# Patient Record
Sex: FEMALE | Race: WHITE | NOT HISPANIC OR LATINO | Employment: FULL TIME | URBAN - NONMETROPOLITAN AREA
[De-identification: names, ages, dates, MRNs, and addresses within clinical notes are randomized per-mention and may not be internally consistent; named-entity substitution may affect disease eponyms.]

---

## 2020-01-13 ENCOUNTER — HOSPITAL ENCOUNTER (OUTPATIENT)
Dept: LAB | Facility: MEDICAL CENTER | Age: 30
End: 2020-01-13
Attending: PSYCHIATRY & NEUROLOGY
Payer: MEDICAID

## 2020-01-13 LAB — ERYTHROCYTE [SEDIMENTATION RATE] IN BLOOD BY WESTERGREN METHOD: 6 MM/HOUR (ref 0–20)

## 2020-01-13 PROCEDURE — 86140 C-REACTIVE PROTEIN: CPT

## 2020-01-13 PROCEDURE — 82607 VITAMIN B-12: CPT

## 2020-01-13 PROCEDURE — 86038 ANTINUCLEAR ANTIBODIES: CPT

## 2020-01-13 PROCEDURE — 86376 MICROSOMAL ANTIBODY EACH: CPT

## 2020-01-13 PROCEDURE — 85652 RBC SED RATE AUTOMATED: CPT

## 2020-01-13 PROCEDURE — 86800 THYROGLOBULIN ANTIBODY: CPT

## 2020-01-13 PROCEDURE — 36415 COLL VENOUS BLD VENIPUNCTURE: CPT

## 2020-01-14 LAB
CRP SERPL HS-MCNC: 0.47 MG/DL (ref 0–0.75)
THYROPEROXIDASE AB SERPL-ACNC: 127.4 IU/ML (ref 0–9)
VIT B12 SERPL-MCNC: 272 PG/ML (ref 211–911)

## 2020-01-15 LAB
NUCLEAR IGG SER QL IA: NORMAL
THYROGLOB AB SERPL-ACNC: 1.6 IU/ML (ref 0–4)

## 2020-01-24 ENCOUNTER — HOSPITAL ENCOUNTER (OUTPATIENT)
Dept: LAB | Facility: MEDICAL CENTER | Age: 30
End: 2020-01-24
Attending: PSYCHIATRY & NEUROLOGY
Payer: MEDICAID

## 2020-01-24 LAB
APTT PPP: 29.4 SEC (ref 24.7–36)
BASOPHILS # BLD AUTO: 0.2 % (ref 0–1.8)
BASOPHILS # BLD: 0.02 K/UL (ref 0–0.12)
EOSINOPHIL # BLD AUTO: 0 K/UL (ref 0–0.51)
EOSINOPHIL NFR BLD: 0 % (ref 0–6.9)
ERYTHROCYTE [DISTWIDTH] IN BLOOD BY AUTOMATED COUNT: 45.2 FL (ref 35.9–50)
HCT VFR BLD AUTO: 41.9 % (ref 37–47)
HGB BLD-MCNC: 13.1 G/DL (ref 12–16)
IMM GRANULOCYTES # BLD AUTO: 0.02 K/UL (ref 0–0.11)
IMM GRANULOCYTES NFR BLD AUTO: 0.2 % (ref 0–0.9)
INR PPP: 0.85 (ref 0.87–1.13)
LYMPHOCYTES # BLD AUTO: 2.3 K/UL (ref 1–4.8)
LYMPHOCYTES NFR BLD: 26.4 % (ref 22–41)
MCH RBC QN AUTO: 26.3 PG (ref 27–33)
MCHC RBC AUTO-ENTMCNC: 31.3 G/DL (ref 33.6–35)
MCV RBC AUTO: 84.1 FL (ref 81.4–97.8)
MONOCYTES # BLD AUTO: 0.44 K/UL (ref 0–0.85)
MONOCYTES NFR BLD AUTO: 5.1 % (ref 0–13.4)
NEUTROPHILS # BLD AUTO: 5.92 K/UL (ref 2–7.15)
NEUTROPHILS NFR BLD: 68.1 % (ref 44–72)
NRBC # BLD AUTO: 0 K/UL
NRBC BLD-RTO: 0 /100 WBC
PLATELET # BLD AUTO: 261 K/UL (ref 164–446)
PMV BLD AUTO: 12.6 FL (ref 9–12.9)
PROTHROMBIN TIME: 11.8 SEC (ref 12–14.6)
RBC # BLD AUTO: 4.98 M/UL (ref 4.2–5.4)
WBC # BLD AUTO: 8.7 K/UL (ref 4.8–10.8)

## 2020-01-24 PROCEDURE — 85610 PROTHROMBIN TIME: CPT

## 2020-01-24 PROCEDURE — 36415 COLL VENOUS BLD VENIPUNCTURE: CPT

## 2020-01-24 PROCEDURE — 85730 THROMBOPLASTIN TIME PARTIAL: CPT

## 2020-01-24 PROCEDURE — 85025 COMPLETE CBC W/AUTO DIFF WBC: CPT

## 2020-02-11 ENCOUNTER — HOSPITAL ENCOUNTER (OUTPATIENT)
Dept: RADIOLOGY | Facility: MEDICAL CENTER | Age: 30
End: 2020-02-11
Attending: PSYCHIATRY & NEUROLOGY
Payer: MEDICAID

## 2020-02-11 ENCOUNTER — HOSPITAL ENCOUNTER (OUTPATIENT)
Dept: LAB | Facility: MEDICAL CENTER | Age: 30
End: 2020-02-11
Attending: PSYCHIATRY & NEUROLOGY
Payer: MEDICAID

## 2020-02-11 DIAGNOSIS — G93.2 BENIGN INTRACRANIAL HYPERTENSION: ICD-10-CM

## 2020-02-11 DIAGNOSIS — G43.009 MIGRAINE WITHOUT AURA AND WITHOUT STATUS MIGRAINOSUS, NOT INTRACTABLE: ICD-10-CM

## 2020-02-11 LAB
CLARITY CSF: CLEAR
COLOR CSF: COLORLESS
COLOR SPUN CSF: COLORLESS
CSF COMMENTS 1658: NORMAL
GLUCOSE CSF-MCNC: 52 MG/DL (ref 40–80)
LYMPHOCYTES NFR CSF: 87 %
MONONUC CELLS NFR CSF: 13 %
PROT CSF-MCNC: 28 MG/DL (ref 15–45)
RBC # CSF: <1 CELLS/UL
SPECIMEN VOL CSF: 8.8 ML
TUBE # CSF: 3
TUBE # CSF: 4
WBC # CSF: 1 CELLS/UL (ref 0–10)

## 2020-02-11 PROCEDURE — 83916 OLIGOCLONAL BANDS: CPT

## 2020-02-11 PROCEDURE — 89051 BODY FLUID CELL COUNT: CPT

## 2020-02-11 PROCEDURE — 62270 DX LMBR SPI PNXR: CPT

## 2020-02-11 PROCEDURE — 306637 DX-LUMBAR PUNCTURE FOR DIAGNOSIS

## 2020-02-11 PROCEDURE — 82945 GLUCOSE OTHER FLUID: CPT

## 2020-02-11 PROCEDURE — 84157 ASSAY OF PROTEIN OTHER: CPT

## 2020-02-11 PROCEDURE — 62328 DX LMBR SPI PNXR W/FLUOR/CT: CPT

## 2020-02-11 PROCEDURE — 83873 ASSAY OF CSF PROTEIN: CPT

## 2020-02-11 PROCEDURE — 70551 MRI BRAIN STEM W/O DYE: CPT

## 2020-02-13 LAB — MBP CSF-MCNC: 3.31 NG/ML (ref 0–5.5)

## 2020-02-14 LAB
OLIGOCLONAL BANDS CSF ELPH-IMP: NORMAL
OLIGOCLONAL BANDS CSF IEF: 0 BANDS (ref 0–1)
OLIGOCLONAL BANDS.IT SER+CSF QL: NEGATIVE

## 2021-02-08 ENCOUNTER — HOSPITAL ENCOUNTER (OUTPATIENT)
Dept: LAB | Facility: MEDICAL CENTER | Age: 31
End: 2021-02-08
Attending: NURSE PRACTITIONER
Payer: MEDICAID

## 2021-02-08 LAB
APTT PPP: 31.2 SEC (ref 24.7–36)
BASOPHILS # BLD AUTO: 0.1 % (ref 0–1.8)
BASOPHILS # BLD: 0.01 K/UL (ref 0–0.12)
EOSINOPHIL # BLD AUTO: 0.01 K/UL (ref 0–0.51)
EOSINOPHIL NFR BLD: 0.1 % (ref 0–6.9)
ERYTHROCYTE [DISTWIDTH] IN BLOOD BY AUTOMATED COUNT: 46.2 FL (ref 35.9–50)
HCT VFR BLD AUTO: 42.2 % (ref 37–47)
HGB BLD-MCNC: 12.9 G/DL (ref 12–16)
IMM GRANULOCYTES # BLD AUTO: 0.02 K/UL (ref 0–0.11)
IMM GRANULOCYTES NFR BLD AUTO: 0.2 % (ref 0–0.9)
INR PPP: 0.96 (ref 0.87–1.13)
LYMPHOCYTES # BLD AUTO: 1.99 K/UL (ref 1–4.8)
LYMPHOCYTES NFR BLD: 22.2 % (ref 22–41)
MCH RBC QN AUTO: 26.1 PG (ref 27–33)
MCHC RBC AUTO-ENTMCNC: 30.6 G/DL (ref 33.6–35)
MCV RBC AUTO: 85.4 FL (ref 81.4–97.8)
MONOCYTES # BLD AUTO: 0.58 K/UL (ref 0–0.85)
MONOCYTES NFR BLD AUTO: 6.5 % (ref 0–13.4)
NEUTROPHILS # BLD AUTO: 6.36 K/UL (ref 2–7.15)
NEUTROPHILS NFR BLD: 70.9 % (ref 44–72)
NRBC # BLD AUTO: 0 K/UL
NRBC BLD-RTO: 0 /100 WBC
PLATELET # BLD AUTO: 217 K/UL (ref 164–446)
PMV BLD AUTO: 13.1 FL (ref 9–12.9)
PROTHROMBIN TIME: 13.1 SEC (ref 12–14.6)
RBC # BLD AUTO: 4.94 M/UL (ref 4.2–5.4)
WBC # BLD AUTO: 9 K/UL (ref 4.8–10.8)

## 2021-02-08 PROCEDURE — 36415 COLL VENOUS BLD VENIPUNCTURE: CPT

## 2021-02-08 PROCEDURE — 85610 PROTHROMBIN TIME: CPT

## 2021-02-08 PROCEDURE — 85730 THROMBOPLASTIN TIME PARTIAL: CPT

## 2021-02-08 PROCEDURE — 85025 COMPLETE CBC W/AUTO DIFF WBC: CPT

## 2021-02-11 ENCOUNTER — HOSPITAL ENCOUNTER (OUTPATIENT)
Dept: RADIOLOGY | Facility: MEDICAL CENTER | Age: 31
End: 2021-02-11
Attending: NURSE PRACTITIONER
Payer: MEDICAID

## 2021-02-11 DIAGNOSIS — G93.2 BENIGN INTRACRANIAL HYPERTENSION: ICD-10-CM

## 2021-02-11 DIAGNOSIS — E66.3 SEVERELY OVERWEIGHT: ICD-10-CM

## 2021-02-11 DIAGNOSIS — G43.009 MIGRAINE WITHOUT AURA AND WITHOUT STATUS MIGRAINOSUS, NOT INTRACTABLE: ICD-10-CM

## 2021-02-11 LAB — PROT CSF-MCNC: 26 MG/DL (ref 15–45)

## 2021-02-11 PROCEDURE — 306637 DX-LUMBAR PUNCTURE FOR DIAGNOSIS

## 2021-02-11 PROCEDURE — 62328 DX LMBR SPI PNXR W/FLUOR/CT: CPT

## 2021-02-11 PROCEDURE — 62270 DX LMBR SPI PNXR: CPT

## 2021-02-11 PROCEDURE — 84157 ASSAY OF PROTEIN OTHER: CPT

## 2021-02-19 ENCOUNTER — OFFICE VISIT (OUTPATIENT)
Dept: URGENT CARE | Facility: PHYSICIAN GROUP | Age: 31
End: 2021-02-19
Payer: MEDICAID

## 2021-02-19 VITALS
OXYGEN SATURATION: 97 % | DIASTOLIC BLOOD PRESSURE: 70 MMHG | HEIGHT: 65 IN | BODY MASS INDEX: 40.48 KG/M2 | RESPIRATION RATE: 16 BRPM | HEART RATE: 84 BPM | TEMPERATURE: 97.1 F | WEIGHT: 243 LBS | SYSTOLIC BLOOD PRESSURE: 126 MMHG

## 2021-02-19 DIAGNOSIS — L08.9 SKIN INFECTION: ICD-10-CM

## 2021-02-19 PROCEDURE — 99204 OFFICE O/P NEW MOD 45 MIN: CPT | Performed by: FAMILY MEDICINE

## 2021-02-19 RX ORDER — SUMATRIPTAN 100 MG/1
TABLET, FILM COATED ORAL
COMMUNITY
Start: 2020-12-17 | End: 2021-02-19

## 2021-02-19 RX ORDER — DOXYCYCLINE HYCLATE 100 MG
100 TABLET ORAL 2 TIMES DAILY
Qty: 14 TABLET | Refills: 0 | Status: SHIPPED | OUTPATIENT
Start: 2021-02-19 | End: 2021-02-26

## 2021-02-19 RX ORDER — SERTRALINE HYDROCHLORIDE 100 MG/1
100 TABLET, FILM COATED ORAL DAILY
COMMUNITY
Start: 2020-10-05

## 2021-02-19 RX ORDER — VERAPAMIL HYDROCHLORIDE 240 MG/1
CAPSULE, EXTENDED RELEASE ORAL
Status: ON HOLD | COMMUNITY
Start: 2020-10-05 | End: 2022-12-13

## 2021-02-19 RX ORDER — BUPROPION HYDROCHLORIDE 75 MG/1
TABLET ORAL
Status: ON HOLD | COMMUNITY
Start: 2020-12-28 | End: 2022-12-29

## 2021-02-19 RX ORDER — LEVOTHYROXINE SODIUM 112 UG/1
224-336 TABLET ORAL
COMMUNITY
Start: 2020-12-09

## 2021-02-19 RX ORDER — ACETAZOLAMIDE 250 MG/1
TABLET ORAL
Status: ON HOLD | COMMUNITY
Start: 2020-12-28 | End: 2022-12-02

## 2021-02-19 RX ORDER — SERTRALINE HYDROCHLORIDE 100 MG/1
TABLET, FILM COATED ORAL
COMMUNITY
Start: 2021-01-13 | End: 2021-02-19

## 2021-02-19 RX ORDER — VERAPAMIL HYDROCHLORIDE 240 MG/1
CAPSULE, EXTENDED RELEASE ORAL
COMMUNITY
Start: 2021-01-13 | End: 2021-02-19

## 2021-03-30 ENCOUNTER — OFFICE VISIT (OUTPATIENT)
Dept: URGENT CARE | Facility: PHYSICIAN GROUP | Age: 31
End: 2021-03-30
Payer: MEDICAID

## 2021-03-30 VITALS
RESPIRATION RATE: 16 BRPM | WEIGHT: 245 LBS | HEIGHT: 65 IN | SYSTOLIC BLOOD PRESSURE: 122 MMHG | TEMPERATURE: 97.6 F | BODY MASS INDEX: 40.82 KG/M2 | OXYGEN SATURATION: 96 % | DIASTOLIC BLOOD PRESSURE: 66 MMHG | HEART RATE: 101 BPM

## 2021-03-30 DIAGNOSIS — S05.01XA ABRASION OF RIGHT CORNEA, INITIAL ENCOUNTER: ICD-10-CM

## 2021-03-30 DIAGNOSIS — H10.31 ACUTE BACTERIAL CONJUNCTIVITIS OF RIGHT EYE: ICD-10-CM

## 2021-03-30 PROCEDURE — 99213 OFFICE O/P EST LOW 20 MIN: CPT | Performed by: FAMILY MEDICINE

## 2021-03-30 RX ORDER — ERYTHROMYCIN 5 MG/G
OINTMENT OPHTHALMIC
Qty: 3.5 G | Refills: 5 | Status: ON HOLD | OUTPATIENT
Start: 2021-03-30 | End: 2022-12-29

## 2021-03-30 NOTE — PROGRESS NOTES
Chief Complaint:    Chief Complaint   Patient presents with   • Eye Problem     corneal abrasion, thinks it is possibly infected now, eye redness and drainage        History of Present Illness:    She has long history of getting corneal abrasions. She has dry eyes and occasionally will inadvertently get something into her eye and she will rub it, causing an abrasion. This will happen approximately twice a year. The night of 3/28/21, she felt the same typical thing happened during the night while she was sleeping. She woke up feeling like she had another corneal abrasion. However, now her right eye has become red, more tender, has had crusting of eye upon awakening, and light sensitivity. She feels she now has infection which has happened 3 previous times. She has used E-mycin eye ointment in the past (rx'd by her Ophthalmologist) for similar problem with good results.      Past Medical History:    History reviewed. No pertinent past medical history.    Past Surgical History:    History reviewed. No pertinent surgical history.    Social History:    Social History     Socioeconomic History   • Marital status:      Spouse name: Not on file   • Number of children: Not on file   • Years of education: Not on file   • Highest education level: Not on file   Occupational History   • Not on file   Tobacco Use   • Smoking status: Never Smoker   • Smokeless tobacco: Never Used   Substance and Sexual Activity   • Alcohol use: Not Currently   • Drug use: Not Currently   • Sexual activity: Not on file   Other Topics Concern   • Not on file   Social History Narrative   • Not on file     Social Determinants of Health     Financial Resource Strain:    • Difficulty of Paying Living Expenses:    Food Insecurity:    • Worried About Running Out of Food in the Last Year:    • Ran Out of Food in the Last Year:    Transportation Needs:    • Lack of Transportation (Medical):    • Lack of Transportation (Non-Medical):    Physical  "Activity:    • Days of Exercise per Week:    • Minutes of Exercise per Session:    Stress:    • Feeling of Stress :    Social Connections:    • Frequency of Communication with Friends and Family:    • Frequency of Social Gatherings with Friends and Family:    • Attends Hinduism Services:    • Active Member of Clubs or Organizations:    • Attends Club or Organization Meetings:    • Marital Status:    Intimate Partner Violence:    • Fear of Current or Ex-Partner:    • Emotionally Abused:    • Physically Abused:    • Sexually Abused:      Family History:    History reviewed. No pertinent family history.    Medications:    Current Outpatient Medications on File Prior to Visit   Medication Sig Dispense Refill   • acetaZOLAMIDE (DIAMOX) 250 MG Tab      • sertraline (ZOLOFT) 100 MG Tab   See Instructions, TAKE TWO TABLETS BY MOUTH ONCE DAILY FOR 90 DAYS, # 180 tab, 1 Refill(s), Acute, Pharmacy: [facility], TAKE TWO TABLETS BY MOUTH ONCE DAILY FOR 90 DAYS, 165.1 cm, 07/01/20 14:43:00 PDT, Height/Length (cm) Non-Measured, 106...     • levothyroxine (SYNTHROID) 175 MCG Tab      • buPROPion (WELLBUTRIN) 75 MG Tab      • Verapamil HCl  MG CAPSULE SR 24 HR   See Instructions, TAKE ONE CAPSULE BY MOUTH TWO TIMES A DAY FOR 90 DAYS, # 180 cap, 1 Refill(s), Acute, Pharmacy: [facility], TAKE ONE CAPSULE BY MOUTH TWO TIMES A DAY FOR 90 DAYS, 165.1 cm, 07/01/20 14:43:00 PDT, Height/Length (cm) Non-Kamla...       No current facility-administered medications on file prior to visit.     Allergies:    Allergies   Allergen Reactions   • Prochlorperazine Hives     Other reaction(s): Insomnia, Rapid heart rate   • Lamotrigine Anaphylaxis   • Topiramate Hives and Rash   • Compazine Anxiety and Palpitations   • Azithromycin Itching       Vitals:    Vitals:    03/30/21 1611   BP: 122/66   Pulse: (!) 101   Resp: 16   Temp: 36.4 °C (97.6 °F)   SpO2: 96%   Weight: 111 kg (245 lb)   Height: 1.651 m (5' 5\")       Physical " Exam:    Constitutional: Vital signs reviewed. Appears well-developed and well-nourished. No acute distress.   Eyes: Right conjunctiva is moderately injected. Left sclera white, left conjunctiva clear. PERRLA. Right eye has mild-moderate photophobia.  ENT: External ears normal. Hearing normal.  Neck: Neck supple.   Pulmonary/Chest: Respirations non-labored.   Musculoskeletal: Normal gait. No muscular atrophy or weakness.  Neurological: Alert and oriented to person, place, and time. Muscle tone normal. Coordination normal.   Skin: No rashes or lesions. Warm, dry, normal turgor.  Psychiatric: Normal mood and affect. Behavior is normal. Judgment and thought content normal.       Assessment / Plan:    1. Acute bacterial conjunctivitis of right eye  - erythromycin 5 MG/GM Ointment; APPLY TO AFFECTED EYE EVERY 4 HOURS WHILE AWAKE UNTIL BETTER.  Dispense: 3.5 g; Refill: 5    2. Abrasion of right cornea, initial encounter  - erythromycin 5 MG/GM Ointment; APPLY TO AFFECTED EYE EVERY 4 HOURS WHILE AWAKE UNTIL BETTER.  Dispense: 3.5 g; Refill: 5      Discussed with her DDX, management options, and risks, benefits, and alternatives to treatment plan agreed upon.    Symptoms feel typical of previous similar problem, will treat with medication that has helped in the past.    Agreeable to medication prescribed.    Discussed expected course of duration, time for improvement, and to seek follow-up in Emergency Room, urgent care, or with PCP if getting worse at any time or not improving within expected time frame.

## 2021-08-09 ENCOUNTER — HOSPITAL ENCOUNTER (OUTPATIENT)
Dept: LAB | Facility: MEDICAL CENTER | Age: 31
End: 2021-08-09
Attending: PSYCHIATRY & NEUROLOGY
Payer: MEDICAID

## 2021-08-09 LAB
APTT PPP: 30 SEC (ref 24.7–36)
BASOPHILS # BLD AUTO: 0.1 % (ref 0–1.8)
BASOPHILS # BLD: 0.01 K/UL (ref 0–0.12)
EOSINOPHIL # BLD AUTO: 0 K/UL (ref 0–0.51)
EOSINOPHIL NFR BLD: 0 % (ref 0–6.9)
ERYTHROCYTE [DISTWIDTH] IN BLOOD BY AUTOMATED COUNT: 46.5 FL (ref 35.9–50)
HCT VFR BLD AUTO: 42.2 % (ref 37–47)
HGB BLD-MCNC: 13.1 G/DL (ref 12–16)
IMM GRANULOCYTES # BLD AUTO: 0.04 K/UL (ref 0–0.11)
IMM GRANULOCYTES NFR BLD AUTO: 0.4 % (ref 0–0.9)
INR PPP: 0.92 (ref 0.87–1.13)
LYMPHOCYTES # BLD AUTO: 2.12 K/UL (ref 1–4.8)
LYMPHOCYTES NFR BLD: 21.5 % (ref 22–41)
MCH RBC QN AUTO: 26.7 PG (ref 27–33)
MCHC RBC AUTO-ENTMCNC: 31 G/DL (ref 33.6–35)
MCV RBC AUTO: 85.9 FL (ref 81.4–97.8)
MONOCYTES # BLD AUTO: 0.5 K/UL (ref 0–0.85)
MONOCYTES NFR BLD AUTO: 5.1 % (ref 0–13.4)
NEUTROPHILS # BLD AUTO: 7.21 K/UL (ref 2–7.15)
NEUTROPHILS NFR BLD: 72.9 % (ref 44–72)
NRBC # BLD AUTO: 0 K/UL
NRBC BLD-RTO: 0 /100 WBC
PLATELET # BLD AUTO: 260 K/UL (ref 164–446)
PMV BLD AUTO: 12.7 FL (ref 9–12.9)
PROTHROMBIN TIME: 12.1 SEC (ref 12–14.6)
RBC # BLD AUTO: 4.91 M/UL (ref 4.2–5.4)
WBC # BLD AUTO: 9.9 K/UL (ref 4.8–10.8)

## 2021-08-09 PROCEDURE — 85730 THROMBOPLASTIN TIME PARTIAL: CPT

## 2021-08-09 PROCEDURE — 85610 PROTHROMBIN TIME: CPT

## 2021-08-09 PROCEDURE — 36415 COLL VENOUS BLD VENIPUNCTURE: CPT

## 2021-08-09 PROCEDURE — 85025 COMPLETE CBC W/AUTO DIFF WBC: CPT

## 2021-08-16 ENCOUNTER — HOSPITAL ENCOUNTER (OUTPATIENT)
Dept: RADIOLOGY | Facility: MEDICAL CENTER | Age: 31
End: 2021-08-16
Attending: PSYCHIATRY & NEUROLOGY
Payer: MEDICAID

## 2021-08-16 ENCOUNTER — HOSPITAL ENCOUNTER (OUTPATIENT)
Facility: MEDICAL CENTER | Age: 31
End: 2021-08-16
Attending: PSYCHIATRY & NEUROLOGY
Payer: MEDICAID

## 2021-08-16 DIAGNOSIS — G93.2 BENIGN INTRACRANIAL HYPERTENSION: ICD-10-CM

## 2021-08-16 DIAGNOSIS — G43.009 MIGRAINE WITHOUT AURA, NOT INTRACTABLE, WITHOUT STATUS MIGRAINOSUS: ICD-10-CM

## 2021-08-16 LAB
BURR CELLS/RBC NFR CSF MANUAL: 0 %
CLARITY CSF: CLEAR
COLOR CSF: COLORLESS
COLOR SPUN CSF: COLORLESS
CSF COMMENTS 1658: NORMAL
GLUCOSE CSF-MCNC: 55 MG/DL (ref 40–80)
PROT CSF-MCNC: 20 MG/DL (ref 15–45)
RBC # CSF: 1 CELLS/UL
SPECIMEN VOL CSF: 5 ML
TUBE # CSF: 1
TUBE # CSF: 1
WBC # CSF: <1 CELLS/UL (ref 0–10)

## 2021-08-16 PROCEDURE — 84157 ASSAY OF PROTEIN OTHER: CPT

## 2021-08-16 PROCEDURE — 82945 GLUCOSE OTHER FLUID: CPT

## 2021-08-16 PROCEDURE — 62270 DX LMBR SPI PNXR: CPT

## 2021-08-16 PROCEDURE — 89051 BODY FLUID CELL COUNT: CPT

## 2022-03-04 ENCOUNTER — HOSPITAL ENCOUNTER (OUTPATIENT)
Dept: LAB | Facility: MEDICAL CENTER | Age: 32
End: 2022-03-04
Attending: INTERNAL MEDICINE
Payer: MEDICAID

## 2022-03-04 LAB
ALBUMIN SERPL BCP-MCNC: 4.3 G/DL (ref 3.2–4.9)
ALBUMIN/GLOB SERPL: 1.7 G/DL
ALP SERPL-CCNC: 101 U/L (ref 30–99)
ALT SERPL-CCNC: 44 U/L (ref 2–50)
ANION GAP SERPL CALC-SCNC: 11 MMOL/L (ref 7–16)
APPEARANCE UR: ABNORMAL
AST SERPL-CCNC: 42 U/L (ref 12–45)
BACTERIA #/AREA URNS HPF: ABNORMAL /HPF
BASOPHILS # BLD AUTO: 0.2 % (ref 0–1.8)
BASOPHILS # BLD: 0.01 K/UL (ref 0–0.12)
BILIRUB SERPL-MCNC: 0.2 MG/DL (ref 0.1–1.5)
BILIRUB UR QL STRIP.AUTO: NEGATIVE
BUN SERPL-MCNC: 7 MG/DL (ref 8–22)
CALCIUM SERPL-MCNC: 9.3 MG/DL (ref 8.5–10.5)
CHLORIDE SERPL-SCNC: 106 MMOL/L (ref 96–112)
CO2 SERPL-SCNC: 24 MMOL/L (ref 20–33)
COLOR UR: YELLOW
CREAT SERPL-MCNC: 0.9 MG/DL (ref 0.5–1.4)
EOSINOPHIL # BLD AUTO: 0 K/UL (ref 0–0.51)
EOSINOPHIL NFR BLD: 0 % (ref 0–6.9)
EPI CELLS #/AREA URNS HPF: ABNORMAL /HPF
ERYTHROCYTE [DISTWIDTH] IN BLOOD BY AUTOMATED COUNT: 41.5 FL (ref 35.9–50)
GLOBULIN SER CALC-MCNC: 2.5 G/DL (ref 1.9–3.5)
GLUCOSE SERPL-MCNC: 88 MG/DL (ref 65–99)
GLUCOSE UR STRIP.AUTO-MCNC: NEGATIVE MG/DL
HCT VFR BLD AUTO: 40.3 % (ref 37–47)
HGB BLD-MCNC: 13.1 G/DL (ref 12–16)
HYALINE CASTS #/AREA URNS LPF: ABNORMAL /LPF
IMM GRANULOCYTES # BLD AUTO: 0.01 K/UL (ref 0–0.11)
IMM GRANULOCYTES NFR BLD AUTO: 0.2 % (ref 0–0.9)
KETONES UR STRIP.AUTO-MCNC: NEGATIVE MG/DL
LEUKOCYTE ESTERASE UR QL STRIP.AUTO: ABNORMAL
LYMPHOCYTES # BLD AUTO: 1.92 K/UL (ref 1–4.8)
LYMPHOCYTES NFR BLD: 30.7 % (ref 22–41)
MCH RBC QN AUTO: 26.5 PG (ref 27–33)
MCHC RBC AUTO-ENTMCNC: 32.5 G/DL (ref 33.6–35)
MCV RBC AUTO: 81.6 FL (ref 81.4–97.8)
MICRO URNS: ABNORMAL
MONOCYTES # BLD AUTO: 0.47 K/UL (ref 0–0.85)
MONOCYTES NFR BLD AUTO: 7.5 % (ref 0–13.4)
NEUTROPHILS # BLD AUTO: 3.84 K/UL (ref 2–7.15)
NEUTROPHILS NFR BLD: 61.4 % (ref 44–72)
NITRITE UR QL STRIP.AUTO: NEGATIVE
NRBC # BLD AUTO: 0 K/UL
NRBC BLD-RTO: 0 /100 WBC
PH UR STRIP.AUTO: 6 [PH] (ref 5–8)
PLATELET # BLD AUTO: 228 K/UL (ref 164–446)
PMV BLD AUTO: 12.6 FL (ref 9–12.9)
POTASSIUM SERPL-SCNC: 4.5 MMOL/L (ref 3.6–5.5)
PROT SERPL-MCNC: 6.8 G/DL (ref 6–8.2)
PROT UR QL STRIP: NEGATIVE MG/DL
RBC # BLD AUTO: 4.94 M/UL (ref 4.2–5.4)
RBC # URNS HPF: ABNORMAL /HPF
RBC UR QL AUTO: NEGATIVE
SODIUM SERPL-SCNC: 141 MMOL/L (ref 135–145)
SP GR UR STRIP.AUTO: 1.02
TSH SERPL DL<=0.005 MIU/L-ACNC: 7.22 UIU/ML (ref 0.38–5.33)
UROBILINOGEN UR STRIP.AUTO-MCNC: 0.2 MG/DL
WBC # BLD AUTO: 6.3 K/UL (ref 4.8–10.8)
WBC #/AREA URNS HPF: ABNORMAL /HPF

## 2022-03-04 PROCEDURE — 84443 ASSAY THYROID STIM HORMONE: CPT

## 2022-03-04 PROCEDURE — 80053 COMPREHEN METABOLIC PANEL: CPT

## 2022-03-04 PROCEDURE — 36415 COLL VENOUS BLD VENIPUNCTURE: CPT

## 2022-03-04 PROCEDURE — 81001 URINALYSIS AUTO W/SCOPE: CPT

## 2022-03-04 PROCEDURE — 85025 COMPLETE CBC W/AUTO DIFF WBC: CPT

## 2022-04-23 ENCOUNTER — HOSPITAL ENCOUNTER (OUTPATIENT)
Dept: LAB | Facility: MEDICAL CENTER | Age: 32
End: 2022-04-23
Attending: INTERNAL MEDICINE
Payer: MEDICAID

## 2022-04-23 LAB
25(OH)D3 SERPL-MCNC: 19 NG/ML (ref 30–100)
EST. AVERAGE GLUCOSE BLD GHB EST-MCNC: 114 MG/DL
HBA1C MFR BLD: 5.6 % (ref 4–5.6)
T3 SERPL-MCNC: 104 NG/DL (ref 60–181)
T3FREE SERPL-MCNC: 2.85 PG/ML (ref 2–4.4)
T4 FREE SERPL-MCNC: 0.84 NG/DL (ref 0.93–1.7)
TSH SERPL DL<=0.005 MIU/L-ACNC: 9.15 UIU/ML (ref 0.38–5.33)

## 2022-04-23 PROCEDURE — 84443 ASSAY THYROID STIM HORMONE: CPT

## 2022-04-23 PROCEDURE — 83036 HEMOGLOBIN GLYCOSYLATED A1C: CPT

## 2022-04-23 PROCEDURE — 82306 VITAMIN D 25 HYDROXY: CPT

## 2022-04-23 PROCEDURE — 84480 ASSAY TRIIODOTHYRONINE (T3): CPT

## 2022-04-23 PROCEDURE — 84481 FREE ASSAY (FT-3): CPT

## 2022-04-23 PROCEDURE — 36415 COLL VENOUS BLD VENIPUNCTURE: CPT

## 2022-04-23 PROCEDURE — 84439 ASSAY OF FREE THYROXINE: CPT

## 2022-10-18 ENCOUNTER — APPOINTMENT (OUTPATIENT)
Dept: URGENT CARE | Facility: CLINIC | Age: 32
End: 2022-10-18

## 2022-12-02 ENCOUNTER — HOSPITAL ENCOUNTER (INPATIENT)
Facility: MEDICAL CENTER | Age: 32
LOS: 11 days | DRG: 833 | End: 2022-12-13
Attending: OBSTETRICS & GYNECOLOGY | Admitting: OBSTETRICS & GYNECOLOGY
Payer: COMMERCIAL

## 2022-12-02 DIAGNOSIS — O13.3 GESTATIONAL HYPERTENSION, THIRD TRIMESTER: ICD-10-CM

## 2022-12-02 PROBLEM — E03.8 HYPOTHYROIDISM DUE TO HASHIMOTO'S THYROIDITIS: Chronic | Status: ACTIVE | Noted: 2022-12-02

## 2022-12-02 PROBLEM — G93.2 INTRACRANIAL HYPERTENSION: Chronic | Status: ACTIVE | Noted: 2022-12-02

## 2022-12-02 PROBLEM — E06.3 HYPOTHYROIDISM DUE TO HASHIMOTO'S THYROIDITIS: Chronic | Status: ACTIVE | Noted: 2022-12-02

## 2022-12-02 PROBLEM — F34.1 PERSISTENT DEPRESSIVE DISORDER: Status: ACTIVE | Noted: 2022-12-02

## 2022-12-02 PROBLEM — O14.90 PRE-ECLAMPSIA AFFECTING PREGNANCY, ANTEPARTUM: Status: ACTIVE | Noted: 2022-12-02

## 2022-12-02 LAB
ABO + RH BLD: NORMAL
ABO GROUP BLD: NORMAL
ALBUMIN SERPL BCP-MCNC: 3.4 G/DL (ref 3.2–4.9)
ALBUMIN/GLOB SERPL: 1.4 G/DL
ALP SERPL-CCNC: 101 U/L (ref 30–99)
ALT SERPL-CCNC: 13 U/L (ref 2–50)
ANION GAP SERPL CALC-SCNC: 12 MMOL/L (ref 7–16)
AST SERPL-CCNC: 14 U/L (ref 12–45)
BASOPHILS # BLD AUTO: 0.1 % (ref 0–1.8)
BASOPHILS # BLD: 0.01 K/UL (ref 0–0.12)
BILIRUB SERPL-MCNC: <0.2 MG/DL (ref 0.1–1.5)
BLD GP AB SCN SERPL QL: NORMAL
BUN SERPL-MCNC: 5 MG/DL (ref 8–22)
CALCIUM SERPL-MCNC: 8.6 MG/DL (ref 8.5–10.5)
CHLORIDE SERPL-SCNC: 105 MMOL/L (ref 96–112)
CO2 SERPL-SCNC: 22 MMOL/L (ref 20–33)
CREAT SERPL-MCNC: 0.57 MG/DL (ref 0.5–1.4)
EOSINOPHIL # BLD AUTO: 0 K/UL (ref 0–0.51)
EOSINOPHIL NFR BLD: 0 % (ref 0–6.9)
ERYTHROCYTE [DISTWIDTH] IN BLOOD BY AUTOMATED COUNT: 42.1 FL (ref 35.9–50)
GFR SERPLBLD CREATININE-BSD FMLA CKD-EPI: 124 ML/MIN/1.73 M 2
GLOBULIN SER CALC-MCNC: 2.4 G/DL (ref 1.9–3.5)
GLUCOSE SERPL-MCNC: 81 MG/DL (ref 65–99)
HCT VFR BLD AUTO: 35.6 % (ref 37–47)
HGB BLD-MCNC: 11.5 G/DL (ref 12–16)
IMM GRANULOCYTES # BLD AUTO: 0.1 K/UL (ref 0–0.11)
IMM GRANULOCYTES NFR BLD AUTO: 0.7 % (ref 0–0.9)
LYMPHOCYTES # BLD AUTO: 1.32 K/UL (ref 1–4.8)
LYMPHOCYTES NFR BLD: 9.2 % (ref 22–41)
MAGNESIUM SERPL-MCNC: 3.9 MG/DL (ref 1.5–2.5)
MAGNESIUM SERPL-MCNC: 4.7 MG/DL (ref 1.5–2.5)
MCH RBC QN AUTO: 26.8 PG (ref 27–33)
MCHC RBC AUTO-ENTMCNC: 32.3 G/DL (ref 33.6–35)
MCV RBC AUTO: 83 FL (ref 81.4–97.8)
MONOCYTES # BLD AUTO: 0.43 K/UL (ref 0–0.85)
MONOCYTES NFR BLD AUTO: 3 % (ref 0–13.4)
NEUTROPHILS # BLD AUTO: 12.55 K/UL (ref 2–7.15)
NEUTROPHILS NFR BLD: 87 % (ref 44–72)
NRBC # BLD AUTO: 0 K/UL
NRBC BLD-RTO: 0 /100 WBC
PLATELET # BLD AUTO: 187 K/UL (ref 164–446)
PMV BLD AUTO: 13.3 FL (ref 9–12.9)
POTASSIUM SERPL-SCNC: 4.1 MMOL/L (ref 3.6–5.5)
PROT SERPL-MCNC: 5.8 G/DL (ref 6–8.2)
RBC # BLD AUTO: 4.29 M/UL (ref 4.2–5.4)
RH BLD: NORMAL
SODIUM SERPL-SCNC: 139 MMOL/L (ref 135–145)
T PALLIDUM AB SER QL IA: NORMAL
TSH SERPL DL<=0.005 MIU/L-ACNC: 0.55 UIU/ML (ref 0.38–5.33)
WBC # BLD AUTO: 14.4 K/UL (ref 4.8–10.8)

## 2022-12-02 PROCEDURE — 84443 ASSAY THYROID STIM HORMONE: CPT

## 2022-12-02 PROCEDURE — 700102 HCHG RX REV CODE 250 W/ 637 OVERRIDE(OP): Performed by: OBSTETRICS & GYNECOLOGY

## 2022-12-02 PROCEDURE — 700111 HCHG RX REV CODE 636 W/ 250 OVERRIDE (IP): Performed by: OBSTETRICS & GYNECOLOGY

## 2022-12-02 PROCEDURE — A9270 NON-COVERED ITEM OR SERVICE: HCPCS | Performed by: OBSTETRICS & GYNECOLOGY

## 2022-12-02 PROCEDURE — 86850 RBC ANTIBODY SCREEN: CPT

## 2022-12-02 PROCEDURE — 83735 ASSAY OF MAGNESIUM: CPT | Mod: 91

## 2022-12-02 PROCEDURE — 86900 BLOOD TYPING SEROLOGIC ABO: CPT

## 2022-12-02 PROCEDURE — 86780 TREPONEMA PALLIDUM: CPT

## 2022-12-02 PROCEDURE — 80053 COMPREHEN METABOLIC PANEL: CPT

## 2022-12-02 PROCEDURE — 770002 HCHG ROOM/CARE - OB PRIVATE (112)

## 2022-12-02 PROCEDURE — 85025 COMPLETE CBC W/AUTO DIFF WBC: CPT

## 2022-12-02 PROCEDURE — 36415 COLL VENOUS BLD VENIPUNCTURE: CPT

## 2022-12-02 PROCEDURE — 700105 HCHG RX REV CODE 258: Performed by: OBSTETRICS & GYNECOLOGY

## 2022-12-02 PROCEDURE — 86901 BLOOD TYPING SEROLOGIC RH(D): CPT

## 2022-12-02 PROCEDURE — 302790 HCHG STAT ANTEPARTUM CARE, DAILY

## 2022-12-02 RX ORDER — MAGNESIUM SULFATE HEPTAHYDRATE 40 MG/ML
INJECTION, SOLUTION INTRAVENOUS
Status: ACTIVE
Start: 2022-12-02 | End: 2022-12-03

## 2022-12-02 RX ORDER — FEXOFENADINE HCL 60 MG/1
60 TABLET, FILM COATED ORAL DAILY
Status: DISCONTINUED | OUTPATIENT
Start: 2022-12-03 | End: 2022-12-06

## 2022-12-02 RX ORDER — CALCIUM GLUCONATE 94 MG/ML
1 INJECTION, SOLUTION INTRAVENOUS
Status: DISCONTINUED | OUTPATIENT
Start: 2022-12-02 | End: 2022-12-04

## 2022-12-02 RX ORDER — MAGNESIUM SULFATE HEPTAHYDRATE 40 MG/ML
2 INJECTION, SOLUTION INTRAVENOUS CONTINUOUS
Status: DISCONTINUED | OUTPATIENT
Start: 2022-12-02 | End: 2022-12-04

## 2022-12-02 RX ORDER — ALBUTEROL SULFATE 90 UG/1
2 AEROSOL, METERED RESPIRATORY (INHALATION) PRN
Status: DISCONTINUED | OUTPATIENT
Start: 2022-12-02 | End: 2022-12-13 | Stop reason: HOSPADM

## 2022-12-02 RX ORDER — ACETAMINOPHEN 500 MG
1000 TABLET ORAL EVERY 4 HOURS PRN
Status: DISCONTINUED | OUTPATIENT
Start: 2022-12-02 | End: 2022-12-13 | Stop reason: HOSPADM

## 2022-12-02 RX ORDER — OMEPRAZOLE 20 MG/1
20 CAPSULE, DELAYED RELEASE ORAL DAILY
Status: DISCONTINUED | OUTPATIENT
Start: 2022-12-03 | End: 2022-12-13 | Stop reason: HOSPADM

## 2022-12-02 RX ORDER — SODIUM CHLORIDE, SODIUM LACTATE, POTASSIUM CHLORIDE, CALCIUM CHLORIDE 600; 310; 30; 20 MG/100ML; MG/100ML; MG/100ML; MG/100ML
INJECTION, SOLUTION INTRAVENOUS CONTINUOUS
Status: DISCONTINUED | OUTPATIENT
Start: 2022-12-02 | End: 2022-12-13 | Stop reason: HOSPADM

## 2022-12-02 RX ORDER — ONDANSETRON 2 MG/ML
4 INJECTION INTRAMUSCULAR; INTRAVENOUS EVERY 4 HOURS PRN
Status: DISCONTINUED | OUTPATIENT
Start: 2022-12-02 | End: 2022-12-13 | Stop reason: HOSPADM

## 2022-12-02 RX ORDER — SUMATRIPTAN 50 MG/1
100 TABLET, FILM COATED ORAL ONCE
Status: COMPLETED | OUTPATIENT
Start: 2022-12-02 | End: 2022-12-02

## 2022-12-02 RX ADMIN — SODIUM CHLORIDE, POTASSIUM CHLORIDE, SODIUM LACTATE AND CALCIUM CHLORIDE: 600; 310; 30; 20 INJECTION, SOLUTION INTRAVENOUS at 16:45

## 2022-12-02 RX ADMIN — SUMATRIPTAN SUCCINATE 100 MG: 50 TABLET ORAL at 18:49

## 2022-12-02 RX ADMIN — MAGNESIUM SULFATE HEPTAHYDRATE 2 G/HR: 40 INJECTION, SOLUTION INTRAVENOUS at 16:45

## 2022-12-02 ASSESSMENT — PATIENT HEALTH QUESTIONNAIRE - PHQ9
7. TROUBLE CONCENTRATING ON THINGS, SUCH AS READING THE NEWSPAPER OR WATCHING TELEVISION: SEVERAL DAYS
6. FEELING BAD ABOUT YOURSELF - OR THAT YOU ARE A FAILURE OR HAVE LET YOURSELF OR YOUR FAMILY DOWN: NOT AL ALL
5. POOR APPETITE OR OVEREATING: NOT AT ALL
9. THOUGHTS THAT YOU WOULD BE BETTER OFF DEAD, OR OF HURTING YOURSELF: NOT AT ALL
2. FEELING DOWN, DEPRESSED, IRRITABLE, OR HOPELESS: NOT AT ALL
4. FEELING TIRED OR HAVING LITTLE ENERGY: MORE THAN HALF THE DAYS
1. LITTLE INTEREST OR PLEASURE IN DOING THINGS: SEVERAL DAYS
3. TROUBLE FALLING OR STAYING ASLEEP OR SLEEPING TOO MUCH: NEARLY EVERY DAY
8. MOVING OR SPEAKING SO SLOWLY THAT OTHER PEOPLE COULD HAVE NOTICED. OR THE OPPOSITE, BEING SO FIGETY OR RESTLESS THAT YOU HAVE BEEN MOVING AROUND A LOT MORE THAN USUAL: NOT AT ALL
SUM OF ALL RESPONSES TO PHQ9 QUESTIONS 1 AND 2: 1
SUM OF ALL RESPONSES TO PHQ QUESTIONS 1-9: 7

## 2022-12-02 ASSESSMENT — FIBROSIS 4 INDEX: FIB4 SCORE: 0.57

## 2022-12-02 ASSESSMENT — LIFESTYLE VARIABLES
ALCOHOL_USE: NO
EVER_SMOKED: NEVER

## 2022-12-03 LAB
ALBUMIN SERPL BCP-MCNC: 3.1 G/DL (ref 3.2–4.9)
ALBUMIN/GLOB SERPL: 1.2 G/DL
ALP SERPL-CCNC: 96 U/L (ref 30–99)
ALT SERPL-CCNC: 12 U/L (ref 2–50)
ANION GAP SERPL CALC-SCNC: 12 MMOL/L (ref 7–16)
AST SERPL-CCNC: 16 U/L (ref 12–45)
BILIRUB SERPL-MCNC: <0.2 MG/DL (ref 0.1–1.5)
BUN SERPL-MCNC: 7 MG/DL (ref 8–22)
CALCIUM SERPL-MCNC: 7.7 MG/DL (ref 8.5–10.5)
CHLORIDE SERPL-SCNC: 104 MMOL/L (ref 96–112)
CO2 SERPL-SCNC: 21 MMOL/L (ref 20–33)
CREAT SERPL-MCNC: 0.59 MG/DL (ref 0.5–1.4)
ERYTHROCYTE [DISTWIDTH] IN BLOOD BY AUTOMATED COUNT: 42.5 FL (ref 35.9–50)
GFR SERPLBLD CREATININE-BSD FMLA CKD-EPI: 123 ML/MIN/1.73 M 2
GLOBULIN SER CALC-MCNC: 2.5 G/DL (ref 1.9–3.5)
GLUCOSE BLD STRIP.AUTO-MCNC: 115 MG/DL (ref 65–99)
GLUCOSE BLD STRIP.AUTO-MCNC: 187 MG/DL (ref 65–99)
GLUCOSE SERPL-MCNC: 110 MG/DL (ref 65–99)
HCT VFR BLD AUTO: 35.4 % (ref 37–47)
HGB BLD-MCNC: 11.5 G/DL (ref 12–16)
LDH SERPL L TO P-CCNC: 258 U/L (ref 107–266)
MAGNESIUM SERPL-MCNC: 5.2 MG/DL (ref 1.5–2.5)
MAGNESIUM SERPL-MCNC: 5.3 MG/DL (ref 1.5–2.5)
MAGNESIUM SERPL-MCNC: 5.4 MG/DL (ref 1.5–2.5)
MAGNESIUM SERPL-MCNC: 5.4 MG/DL (ref 1.5–2.5)
MCH RBC QN AUTO: 27.1 PG (ref 27–33)
MCHC RBC AUTO-ENTMCNC: 32.5 G/DL (ref 33.6–35)
MCV RBC AUTO: 83.3 FL (ref 81.4–97.8)
PLATELET # BLD AUTO: 191 K/UL (ref 164–446)
PMV BLD AUTO: 12.9 FL (ref 9–12.9)
POTASSIUM SERPL-SCNC: 4.3 MMOL/L (ref 3.6–5.5)
PROT 24H UR-MCNC: 316 MG/24 HR (ref 30–150)
PROT 24H UR-MRATE: 4 MG/DL (ref 0–15)
PROT SERPL-MCNC: 5.6 G/DL (ref 6–8.2)
RBC # BLD AUTO: 4.25 M/UL (ref 4.2–5.4)
SODIUM SERPL-SCNC: 137 MMOL/L (ref 135–145)
SPECIMEN VOL UR: ABNORMAL ML
WBC # BLD AUTO: 15.6 K/UL (ref 4.8–10.8)

## 2022-12-03 PROCEDURE — 36415 COLL VENOUS BLD VENIPUNCTURE: CPT

## 2022-12-03 PROCEDURE — 81050 URINALYSIS VOLUME MEASURE: CPT

## 2022-12-03 PROCEDURE — 700111 HCHG RX REV CODE 636 W/ 250 OVERRIDE (IP): Performed by: OBSTETRICS & GYNECOLOGY

## 2022-12-03 PROCEDURE — 770002 HCHG ROOM/CARE - OB PRIVATE (112)

## 2022-12-03 PROCEDURE — 82962 GLUCOSE BLOOD TEST: CPT

## 2022-12-03 PROCEDURE — 85027 COMPLETE CBC AUTOMATED: CPT

## 2022-12-03 PROCEDURE — 302790 HCHG STAT ANTEPARTUM CARE, DAILY

## 2022-12-03 PROCEDURE — 700102 HCHG RX REV CODE 250 W/ 637 OVERRIDE(OP): Performed by: OBSTETRICS & GYNECOLOGY

## 2022-12-03 PROCEDURE — 84156 ASSAY OF PROTEIN URINE: CPT

## 2022-12-03 PROCEDURE — 83735 ASSAY OF MAGNESIUM: CPT | Mod: 91

## 2022-12-03 PROCEDURE — 700111 HCHG RX REV CODE 636 W/ 250 OVERRIDE (IP)

## 2022-12-03 PROCEDURE — 80053 COMPREHEN METABOLIC PANEL: CPT

## 2022-12-03 PROCEDURE — A9270 NON-COVERED ITEM OR SERVICE: HCPCS | Performed by: OBSTETRICS & GYNECOLOGY

## 2022-12-03 PROCEDURE — 83615 LACTATE (LD) (LDH) ENZYME: CPT

## 2022-12-03 PROCEDURE — 700105 HCHG RX REV CODE 258: Performed by: OBSTETRICS & GYNECOLOGY

## 2022-12-03 RX ORDER — HYDRALAZINE HYDROCHLORIDE 20 MG/ML
5 INJECTION INTRAMUSCULAR; INTRAVENOUS ONCE
Status: COMPLETED | OUTPATIENT
Start: 2022-12-03 | End: 2022-12-03

## 2022-12-03 RX ORDER — NIFEDIPINE 10 MG/1
10 CAPSULE ORAL EVERY 8 HOURS
Status: DISCONTINUED | OUTPATIENT
Start: 2022-12-03 | End: 2022-12-03

## 2022-12-03 RX ORDER — CALCIUM CARBONATE 500 MG/1
500 TABLET, CHEWABLE ORAL DAILY
Status: DISCONTINUED | OUTPATIENT
Start: 2022-12-03 | End: 2022-12-04

## 2022-12-03 RX ORDER — ALUMINA, MAGNESIA, AND SIMETHICONE 2400; 2400; 240 MG/30ML; MG/30ML; MG/30ML
10 SUSPENSION ORAL ONCE
Status: COMPLETED | OUTPATIENT
Start: 2022-12-03 | End: 2022-12-03

## 2022-12-03 RX ORDER — LABETALOL 100 MG/1
100 TABLET, FILM COATED ORAL TWICE DAILY
Status: DISCONTINUED | OUTPATIENT
Start: 2022-12-03 | End: 2022-12-04

## 2022-12-03 RX ORDER — HYDRALAZINE HYDROCHLORIDE 20 MG/ML
INJECTION INTRAMUSCULAR; INTRAVENOUS
Status: COMPLETED
Start: 2022-12-03 | End: 2022-12-03

## 2022-12-03 RX ORDER — NIFEDIPINE 10 MG/1
10 CAPSULE ORAL ONCE
Status: COMPLETED | OUTPATIENT
Start: 2022-12-03 | End: 2022-12-03

## 2022-12-03 RX ORDER — LEVOTHYROXINE SODIUM 112 UG/1
336 TABLET ORAL
Status: DISCONTINUED | OUTPATIENT
Start: 2022-12-06 | End: 2022-12-13 | Stop reason: HOSPADM

## 2022-12-03 RX ORDER — BETAMETHASONE SODIUM PHOSPHATE AND BETAMETHASONE ACETATE 3; 3 MG/ML; MG/ML
12 INJECTION, SUSPENSION INTRA-ARTICULAR; INTRALESIONAL; INTRAMUSCULAR; SOFT TISSUE EVERY 24 HOURS
Status: DISPENSED | OUTPATIENT
Start: 2022-12-03 | End: 2022-12-05

## 2022-12-03 RX ORDER — LEVOTHYROXINE SODIUM 112 UG/1
224 TABLET ORAL
Status: DISCONTINUED | OUTPATIENT
Start: 2022-12-03 | End: 2022-12-13 | Stop reason: HOSPADM

## 2022-12-03 RX ORDER — LEVOTHYROXINE SODIUM 112 UG/1
224 TABLET ORAL
Status: DISCONTINUED | OUTPATIENT
Start: 2022-12-03 | End: 2022-12-03

## 2022-12-03 RX ADMIN — BETAMETHASONE SODIUM PHOSPHATE AND BETAMETHASONE ACETATE 12 MG: 3; 3 INJECTION, SUSPENSION INTRA-ARTICULAR; INTRALESIONAL; INTRAMUSCULAR at 13:56

## 2022-12-03 RX ADMIN — LEVOTHYROXINE SODIUM 224 MCG: 0.11 TABLET ORAL at 06:35

## 2022-12-03 RX ADMIN — HYDRALAZINE HYDROCHLORIDE 5 MG: 20 INJECTION INTRAMUSCULAR; INTRAVENOUS at 08:15

## 2022-12-03 RX ADMIN — ACETAMINOPHEN 1000 MG: 500 TABLET ORAL at 04:09

## 2022-12-03 RX ADMIN — SERTRALINE 200 MG: 50 TABLET, FILM COATED ORAL at 06:35

## 2022-12-03 RX ADMIN — SODIUM CHLORIDE, POTASSIUM CHLORIDE, SODIUM LACTATE AND CALCIUM CHLORIDE: 600; 310; 30; 20 INJECTION, SOLUTION INTRAVENOUS at 17:52

## 2022-12-03 RX ADMIN — FEXOFENADINE HCL 60 MG: 60 TABLET, FILM COATED ORAL at 06:35

## 2022-12-03 RX ADMIN — ACETAMINOPHEN 1000 MG: 500 TABLET ORAL at 20:30

## 2022-12-03 RX ADMIN — CALCIUM CARBONATE 500 MG: 500 TABLET, CHEWABLE ORAL at 07:58

## 2022-12-03 RX ADMIN — ONDANSETRON 4 MG: 2 INJECTION INTRAMUSCULAR; INTRAVENOUS at 08:04

## 2022-12-03 RX ADMIN — HYDRALAZINE HYDROCHLORIDE 5 MG: 20 INJECTION INTRAMUSCULAR; INTRAVENOUS at 08:44

## 2022-12-03 RX ADMIN — ALUMINUM HYDROXIDE, MAGNESIUM HYDROXIDE, AND DIMETHICONE 10 ML: 400; 400; 40 SUSPENSION ORAL at 09:24

## 2022-12-03 RX ADMIN — MAGNESIUM SULFATE HEPTAHYDRATE 2 G/HR: 40 INJECTION, SOLUTION INTRAVENOUS at 12:18

## 2022-12-03 RX ADMIN — LABETALOL HYDROCHLORIDE 100 MG: 100 TABLET, FILM COATED ORAL at 10:02

## 2022-12-03 RX ADMIN — NIFEDIPINE 10 MG: 10 CAPSULE ORAL at 09:38

## 2022-12-03 RX ADMIN — ACETAMINOPHEN 1000 MG: 500 TABLET ORAL at 10:05

## 2022-12-03 RX ADMIN — SODIUM CHLORIDE, POTASSIUM CHLORIDE, SODIUM LACTATE AND CALCIUM CHLORIDE: 600; 310; 30; 20 INJECTION, SOLUTION INTRAVENOUS at 04:51

## 2022-12-03 RX ADMIN — OMEPRAZOLE 20 MG: 20 CAPSULE, DELAYED RELEASE ORAL at 06:36

## 2022-12-03 RX ADMIN — LABETALOL HYDROCHLORIDE 100 MG: 100 TABLET, FILM COATED ORAL at 19:43

## 2022-12-03 RX ADMIN — INSULIN HUMAN 8 UNITS: 100 INJECTION, SUSPENSION SUBCUTANEOUS at 22:09

## 2022-12-03 ASSESSMENT — PAIN DESCRIPTION - PAIN TYPE: TYPE: ACUTE PAIN

## 2022-12-03 NOTE — PROGRESS NOTES
S: Complaining of headache and heartburn.  No relief with antacids.    O: Patient Vitals for the past 24 hrs:   BP Temp Temp src Pulse Resp SpO2 Height Weight   12/03/22 1020 -- -- -- 91 -- -- -- --   12/03/22 1019 (!) 142/84 -- -- 93 -- -- -- --   12/03/22 1018 -- -- -- 93 -- -- -- --   12/03/22 1016 -- -- -- 88 -- -- -- --   12/03/22 1015 (!) 141/84 -- -- 90 -- -- -- --   12/03/22 1014 (!) 141/84 -- -- 90 -- -- -- --   12/03/22 1013 -- -- -- 90 -- -- -- --   12/03/22 0959 (!) 141/84 -- -- -- -- -- -- --   12/03/22 0948 -- -- -- 99 -- -- -- --   12/03/22 0944 -- -- -- 92 -- -- -- --   12/03/22 0941 -- -- -- 94 -- -- -- --   12/03/22 0940 -- -- -- 100 -- -- -- --   12/03/22 0936 -- -- -- (!) 104 -- -- -- --   12/03/22 0932 -- -- -- 96 -- -- -- --   12/03/22 0931 -- -- -- (!) 103 -- -- -- --   12/03/22 0928 -- -- -- 98 -- -- -- --   12/03/22 0925 -- -- -- (!) 105 -- -- -- --   12/03/22 0924 (!) 164/90 -- -- 99 -- -- -- --   12/03/22 0921 -- -- -- 98 -- -- -- --   12/03/22 0920 -- -- -- 99 -- -- -- --   12/03/22 0916 -- -- -- (!) 105 -- -- -- --   12/03/22 0915 (!) 156/103 -- -- 98 -- -- -- --   12/03/22 0912 -- -- -- 95 -- -- -- --   12/03/22 0911 -- -- -- 91 -- -- -- --   12/03/22 0908 -- -- -- 95 -- -- -- --   12/03/22 0906 -- -- -- 91 -- -- -- --   12/03/22 0904 -- -- -- 90 -- -- -- --   12/03/22 0901 -- -- -- 96 -- -- -- --   12/03/22 0900 (!) 156/89 -- -- 95 -- 96 % -- --   12/03/22 0856 -- -- -- 96 -- -- -- --   12/03/22 0852 -- -- -- 91 -- -- -- --   12/03/22 0851 -- -- -- 96 -- -- -- --   12/03/22 0848 -- -- -- 93 -- -- -- --   12/03/22 0846 -- -- -- 97 -- -- -- --   12/03/22 0844 -- -- -- 95 -- -- -- --   12/03/22 0841 -- -- -- 97 -- -- -- --   12/03/22 0840 -- -- -- 95 -- -- -- --   12/03/22 0838 -- -- -- 92 -- -- -- --   12/03/22 0836 -- -- -- 96 -- -- -- --   12/03/22 0832 -- -- -- 94 -- -- -- --   12/03/22 0831 -- -- -- 94 -- -- -- --   12/03/22 0828 -- -- -- 93 -- -- -- --   12/03/22 0826 -- -- -- 89 --  -- -- --   12/03/22 0824 -- -- -- 97 -- -- -- --   12/03/22 0821 -- -- -- 98 -- -- -- --   12/03/22 0820 -- -- -- 95 -- -- -- --   12/03/22 0816 -- -- -- 100 -- -- -- --   12/03/22 0812 -- -- -- 92 -- -- -- --   12/03/22 0811 -- -- -- 92 -- -- -- --   12/03/22 0809 (!) 171/95 -- -- 92 -- -- -- --   12/03/22 0808 -- -- -- 100 -- -- -- --   12/03/22 0806 -- -- -- (!) 104 -- -- -- --   12/03/22 0804 -- -- -- (!) 124 -- -- -- --   12/03/22 0801 -- -- -- (!) 102 -- -- -- --   12/03/22 0800 (!) 172/95 36.1 °C (97 °F) Temporal (!) 103 18 99 % -- --   12/03/22 0756 -- -- -- 92 -- -- -- --   12/03/22 0752 -- -- -- 94 -- -- -- --   12/03/22 0751 -- -- -- 91 -- -- -- --   12/03/22 0750 -- -- -- 97 -- -- -- --   12/03/22 0748 -- -- -- (!) 104 -- -- -- --   12/03/22 0746 -- -- -- (!) 108 -- -- -- --   12/03/22 0744 -- -- -- 91 -- -- -- --   12/03/22 0741 -- -- -- 89 -- -- -- --   12/03/22 0740 -- -- -- (!) 107 -- -- -- --   12/03/22 0736 -- -- -- 92 -- -- -- --   12/03/22 0732 -- -- -- 92 -- -- -- --   12/03/22 0731 -- -- -- 86 -- -- -- --   12/03/22 0728 -- -- -- 88 -- -- -- --   12/03/22 0726 -- -- -- 88 -- -- -- --   12/03/22 0724 -- -- -- (!) 103 -- -- -- --   12/03/22 0721 -- -- -- 99 -- -- -- --   12/03/22 0720 -- -- -- (!) 107 -- -- -- --   12/03/22 0716 -- -- -- 88 -- -- -- --   12/03/22 0712 -- -- -- 97 -- -- -- --   12/03/22 0711 -- -- -- 93 -- -- -- --   12/03/22 0708 -- -- -- 86 -- -- -- --   12/03/22 0706 -- -- -- 91 -- -- -- --   12/03/22 0704 -- -- -- 85 -- -- -- --   12/03/22 0701 -- -- -- 90 -- -- -- --   12/03/22 0700 (!) 146/79 -- -- 87 -- 89 % -- --   12/03/22 0656 -- -- -- 88 -- -- -- --   12/03/22 0652 -- -- -- 85 -- -- -- --   12/03/22 0651 -- -- -- 82 -- -- -- --   12/03/22 0649 -- -- -- 80 -- -- -- --   12/03/22 0648 -- -- -- 82 -- -- -- --   12/03/22 0646 -- -- -- 83 -- -- -- --   12/03/22 0644 -- -- -- 85 -- -- -- --   12/03/22 0641 -- -- -- 78 -- -- -- --   12/03/22 0640 -- -- -- 81 -- -- -- --    12/03/22 0636 -- -- -- 90 -- -- -- --   12/03/22 0600 132/62 -- -- 94 -- 97 % -- --   12/03/22 0500 -- -- -- (!) 108 -- 91 % -- --   12/03/22 0451 -- -- -- 88 -- 96 % -- --   12/03/22 0449 (!) 148/78 36.7 °C (98.1 °F) Temporal 78 18 -- -- --   12/03/22 0447 -- -- -- 80 -- -- -- --   12/03/22 0446 -- -- -- 79 -- 96 % -- --   12/03/22 0443 -- -- -- 87 -- -- -- --   12/03/22 0441 -- -- -- 82 -- 100 % -- --   12/03/22 0439 -- -- -- 80 -- -- -- --   12/03/22 0436 -- -- -- -- -- 98 % -- --   12/03/22 0435 -- -- -- 87 -- -- -- --   12/03/22 0431 -- -- -- 82 -- 96 % -- --   12/03/22 0427 -- -- -- 87 -- -- -- --   12/03/22 0426 -- -- -- 91 -- 96 % -- --   12/03/22 0423 -- -- -- 83 -- -- -- --   12/03/22 0421 -- -- -- 82 -- 97 % -- --   12/03/22 0419 -- -- -- 80 -- -- -- --   12/03/22 0416 -- -- -- 89 -- 95 % -- --   12/03/22 0415 -- -- -- 78 -- -- -- --   12/03/22 0411 -- -- -- 84 -- 96 % -- --   12/03/22 0407 -- -- -- 93 -- -- -- --   12/03/22 0406 -- -- -- 95 -- 96 % -- --   12/03/22 0403 -- -- -- 83 -- -- -- --   12/03/22 0401 -- -- -- 85 -- 95 % -- --   12/03/22 0359 -- -- -- 89 -- -- -- --   12/03/22 0356 -- -- -- 83 -- 95 % -- --   12/03/22 0355 -- -- -- 84 -- -- -- --   12/03/22 0351 -- -- -- 83 -- -- -- --   12/03/22 0350 (!) 161/84 36.1 °C (96.9 °F) Temporal 81 16 96 % -- --   12/03/22 0347 -- -- -- 95 -- -- -- --   12/03/22 0346 -- -- -- 82 -- 94 % -- --   12/03/22 0343 -- -- -- 82 -- -- -- --   12/03/22 0341 -- -- -- 79 -- 95 % -- --   12/03/22 0339 -- -- -- 100 -- -- -- --   12/03/22 0336 -- -- -- 83 -- 92 % -- --   12/03/22 0335 -- -- -- 89 -- -- -- --   12/03/22 0331 -- -- -- 82 -- 96 % -- --   12/03/22 0327 -- -- -- 85 -- -- -- --   12/03/22 0326 -- -- -- 85 -- 95 % -- --   12/03/22 0325 (!) 141/78 -- -- 82 -- -- -- --   12/03/22 0323 -- -- -- 92 -- -- -- --   12/03/22 0320 -- -- -- 97 -- 92 % -- --   12/03/22 0319 -- -- -- 91 -- -- -- --   12/03/22 0316 -- -- -- 83 -- 94 % -- --   12/03/22 0315 -- -- -- 89  "-- -- -- --   12/03/22 0311 -- -- -- 91 -- 95 % -- --   12/03/22 0307 -- -- -- 82 -- -- -- --   12/03/22 0306 -- -- -- 91 -- 95 % -- --   12/03/22 0303 -- -- -- 85 -- -- -- --   12/03/22 0301 -- -- -- 84 -- 95 % -- --   12/03/22 0300 (!) 143/76 -- -- -- -- -- -- --   12/03/22 0200 (!) 146/79 -- -- 87 -- 96 % -- --   12/03/22 0100 (!) 154/74 -- -- 93 -- 94 % -- --   12/03/22 0000 (!) 149/79 36.3 °C (97.4 °F) Temporal 91 17 90 % -- --   12/02/22 2300 -- -- -- 93 -- 89 % -- --   12/02/22 2200 135/73 -- -- 81 -- 94 % -- --   12/02/22 2100 137/77 -- -- 86 -- 93 % -- --   12/02/22 2000 (!) 145/70 36.1 °C (96.9 °F) Temporal 86 17 95 % -- --   12/02/22 1900 (!) 152/74 -- -- 100 -- 96 % -- --   12/02/22 1830 -- -- -- 89 -- -- -- --   12/02/22 1820 -- -- -- 89 -- -- -- --   12/02/22 1810 139/74 -- -- 88 -- -- -- --   12/02/22 1800 -- -- -- 100 -- 96 % -- --   12/02/22 1750 -- -- -- 87 -- -- -- --   12/02/22 1740 -- -- -- 91 -- -- -- --   12/02/22 1730 -- -- -- 90 -- -- -- --   12/02/22 1720 -- -- -- 86 -- -- -- --   12/02/22 1710 -- -- -- 90 -- -- -- --   12/02/22 1700 -- -- -- 91 -- -- -- --   12/02/22 1650 -- -- -- 89 -- -- -- --   12/02/22 1640 -- -- -- 93 -- -- -- --   12/02/22 1630 127/71 -- -- 93 -- -- -- --   12/02/22 1629 -- -- -- -- -- -- 1.651 m (5' 5\") 123 kg (271 lb)   12/02/22 1627 127/71 36.3 °C (97.4 °F) Temporal 90 18 -- -- --     Edema: 2-3+  EFM: Moderate variability and accelerations present.   Latest Reference Range & Units 12/03/22 09:42   WBC 4.8 - 10.8 K/uL 15.6 (H)   RBC 4.20 - 5.40 M/uL 4.25   Hemoglobin 12.0 - 16.0 g/dL 11.5 (L)   Hematocrit 37.0 - 47.0 % 35.4 (L)   MCV 81.4 - 97.8 fL 83.3   MCH 27.0 - 33.0 pg 27.1   MCHC 33.6 - 35.0 g/dL 32.5 (L)   RDW 35.9 - 50.0 fL 42.5   Platelet Count 164 - 446 K/uL 191   MPV 9.0 - 12.9 fL 12.9   Sodium 135 - 145 mmol/L 137   Potassium 3.6 - 5.5 mmol/L 4.3   Chloride 96 - 112 mmol/L 104   Co2 20 - 33 mmol/L 21   Anion Gap 7.0 - 16.0  12.0   Glucose 65 - 99 mg/dL " 110 (H)   Bun 8 - 22 mg/dL 7 (L)   Creatinine 0.50 - 1.40 mg/dL 0.59   GFR (CKD-EPI) >60 mL/min/1.73 m 2 123   Calcium 8.5 - 10.5 mg/dL 7.7 (L)   AST(SGOT) 12 - 45 U/L 16   ALT(SGPT) 2 - 50 U/L 12   Alkaline Phosphatase 30 - 99 U/L 96   Total Bilirubin 0.1 - 1.5 mg/dL <0.2   Albumin 3.2 - 4.9 g/dL 3.1 (L)   Total Protein 6.0 - 8.2 g/dL 5.6 (L)   Globulin 1.9 - 3.5 g/dL 2.5   A-G Ratio g/dL 1.2   LDH Total 107 - 266 U/L 258   (H): Data is abnormally high  (L): Data is abnormally low    A: IUP 29 weeks      Gestational hypertension, severe.  Has required emergency treatmemt.       Intracranial hypertension       Hypothyroidism       Major depressive disorder.       Hx of asthma        Obesity        Multiple drug allergies.  P:  NPO for now       IF BP remains unstable, will need delivery.       Second dose of steroids later this afternoon.       Add labetalol 100 mg every 12 hours.       24 hour urine for total protein in progress.       Discussed with Flaca the situation and she may need delivery later today if her BP remains unstable.      Time: 25 minutes.

## 2022-12-03 NOTE — PROGRESS NOTES
1900: Report received from Jennifer CLARK RN. POC discussed    1910: Dr Jay at bedside US    0700: Report given to Jennifer CLARK RN. POC discussed

## 2022-12-03 NOTE — CARE PLAN
Problem: Knowledge Deficit - L&D  Goal: Patient and family/caregivers will demonstrate understanding of plan of care, disease process/condition, diagnostic tests and medications  Outcome: Progressing   The patient is Watcher - Medium risk of patient condition declining or worsening         Progress made toward(s) clinical / shift goals:  Monitor contractions    Patient is not progressing towards the following goals:

## 2022-12-03 NOTE — CARE PLAN
The patient is Stable - Low risk of patient condition declining or worsening    Shift Goals  Clinical Goals: Mag checks  Patient Goals: rest  Family Goals: support    Progress made toward(s) clinical / shift goals:      Patient is not progressing towards the following goals:

## 2022-12-03 NOTE — PROGRESS NOTES
0730 Dr. Jay notified of pt fasting and O2 sats. Pt on 2L of O2. Orders received. Pt also expereincing heartburn. Orders received.     0811 Dr. Jay notified of pt BP. Orders received.     0840 Dr. Jya notified of pt BP. Orders received,     0930 Rn notified Dr. Jay of pt BP. Orders received.     1120 O2 increased to 3L while sleeping.

## 2022-12-03 NOTE — CONSULTS
"[facility] PSYCHIATRIC CONSULT:Intake  Reason for admission: 28 weeks pregnant with HTN  Consulting Physician/SOPHIEN/PA: Rey Jay M.D.  Reason for Consult:SA 2015  Consultant: Omaira Radford MD    Legal Status  vol      CC: Im doing well    HPI:32 yo female, 28 weeks pregnant with first pregnancy which was wanted. She has been on zoloft 200 mg since 2015 for both depression and anxiety and feels it works very well for her. She is currently taking it and it is prescribed by her PCP. She has some changes in sleep, energy and appetite but she says \"this isn't from the depression\" and is not hopeless, unmotivated or SI.     Depression: denies  Anxiety: used to be an anxious person overall but now is anxious if  a situation calls for it and no longer has \"anxiety\" attacks as she did before she was put on zoloft in 2015.    Psychosis: none and not around baby either.  Rani: none  OCD: none: no thoughts of harming self, towards baby    Chart(s) Review:  None on file    Medical ROS:  Review of systems per tx tm: reviewed  Neurology: no concussions, sz    Psychiatric Exam (MSE):  Vitals:Blood pressure (!) 142/84, pulse 91, temperature 36.1 °C (97 °F), temperature source Temporal, resp. rate 18, height 1.651 m (5' 5\"), weight 123 kg (271 lb), SpO2 96 %.    General Appearance: pregnant, flushed, cooperative, good eye contact, clean, diaphoretic  Musculoskeletal: able to move  Alert/Orientation: x 4  Attn/Concentration: intact  Fund of Knowledge:not tested  Memory recent/remote: grossly intact  Speech: wnl  Language:  fluent  Thought Content:no psychosis, SI/HI    Thought Process:   linear,  coherent  Insight/Judgement: intact  Mood: \"good\"  Affect: euthymic though she is in some physical distress    Past Medical Hx:     Past Medical History:   Diagnosis Date    Anxiety     Celiac disease     Depression     Disorder of thyroid     Endometriosis     GERD (gastroesophageal reflux disease)     Hives     " "Intracranial hypertension        Past Psychiatric Hx:  SI/SAs: 2015 only one  Hospitalizations: none  Dx: depression and anxiety  Medication Trials as noted       Family Psych Hx:sister on medications for depression and anxiety  No family history on file.    Social Hx:  Housing: with  and \"fur babies\". Feels safe at home with   Support:    Abuse:denies  Drugs/Alcohol: in 20's alcohol, but none for many years, denies drugs    Labs:  No results found for: AMPHUR, BARBSURINE, BENZODIAZU, COCAINEMET, METHADONE, ECSTASY, OPIATES, OXYCODN, PCPURINE, PROPOXY, CANNABINOID  Recent Labs     12/02/22  1755 12/03/22  0942   WBC 14.4* 15.6*   RBC 4.29 4.25   HEMOGLOBIN 11.5* 11.5*   HEMATOCRIT 35.6* 35.4*   MCV 83.0 83.3   MCH 26.8* 27.1   RDW 42.1 42.5   PLATELETCT 187 191   MPV 13.3* 12.9   NEUTSPOLYS 87.00*  --    LYMPHOCYTES 9.20*  --    MONOCYTES 3.00  --    EOSINOPHILS 0.00  --    BASOPHILS 0.10  --      Recent Labs     12/02/22  0947 12/02/22  1755 12/03/22  0942   SODIUM 136 139 137   POTASSIUM 4.1 4.1 4.3   CHLORIDE 106 105 104   CO2 22 22 21   GLUCOSE 73 81 110*   BUN 6* 5* 7*       Cranial Imaging: personally reviewed  Cranial CT: none  Cranial MRI: 2020 unremarkable    EKG: QTc:  not available       Meds Current:  Scheduled Medications   Medication Dose Frequency    [START ON 12/6/2022] levothyroxine  336 mcg Once per day on Tue Fri    And    levothyroxine  224 mcg Once per day on Sun Mon Wed Thu Sat    calcium carbonate  500 mg DAILY    labetalol  100 mg TWICE DAILY    sertraline  200 mg DAILY    omeprazole  20 mg DAILY    fexofenadine  60 mg DAILY     Allergies: Prochlorperazine, Lamotrigine, Topiramate, Compazine, Azithromycin, and Fremanezumab-vfrm      Assessement    1. Major depressive disorder with anxiety current in remission and stable on zoloft 200 mg    Medical:    -hypothyroidism from hashimoto's with nodule removal. Pt has a hx of mild celiac disease (self report) and cannot take " none known coated thyroid meds.  -gestational hypertension vs preeclampsia  -28 weeks pregnant      Recommendations:  Legal Status: vol : no indications for a hold    Observation status:   -no sitter needed    Discussed/voalted: NIK Jay MD    Medication and Other Recommendations: final orders as per Tx Tm  Discussed risks/benefits of zoloft to baby and herself if she breast feeds and / or stops meds.  Discussed. Post partum depression, psychosis, ruminative thinking and if so, need to get help asap  No recommendations at this time.    Thank you for the consult.   Signing off, please reconsult as needed.       Discharge recommendations: per tx tn

## 2022-12-03 NOTE — PROGRESS NOTES
Pt scored moderate suicide risk. Dr. Jay notified. Orders received. Pt states she does not think she is a danger to self. Consults to social work and psych made.

## 2022-12-03 NOTE — H&P
DATE OF ADMISSION:  2022     REFERRING PHYSICIAN:  Shahid Eduardo MD     REASON FOR TRANSFER:  Gestational hypertension versus preeclampsia, severe.     HISTORY OF PRESENT ILLNESS:  This is a 31-year-old  1, para 0, EDC   2023, currently at 28 weeks and 6 days, transferred today for elevated   blood pressures requiring emergency antihypertensive therapy.     The patient reported edema for the past 2 weeks and today in a routine OB   visit was found to be significantly hypertensive, was then transferred.     PAST MEDICAL HISTORY:  Significant for known history of intracranial   hypertension, which she was previously on acetazolamide but had stopped this   during the pregnancy.  She also reports she is hypothyroid secondary to   Hashimoto's thyroiditis.  Her current Synthroid dose is 224 mcg for 3 days,   336 for 1 day alternating cycles.  She also has a history of chronic   idiopathic hives for which she has previously been on prednisone and   cyclosporine.  She has currently stopped these medications and has a history   of major depressive disorder, previous suicide ideation in .     She denies any current history of seizures, previous hypertension, diabetes,   cardiovascular, respiratory, GI, , endocrine disorders.  Of note, she had a   3-hour GTT, which she reports she passed today.     PREVIOUS OPERATIONS:  T and A, partial thyroidectomy and laparoscopy.     TRANSFUSIONS:  None.     ALLERGIES:  LAMOTRIGINE CAUSES HIVES.  TOPAMAX, ALSO HIVES.  COMPAZINE,   INCREASED HEART RATE.  AZITHROMYCIN, HIVES AND AJOVY, HIVES.     VENEREAL DISEASE HISTORY:  Negative.     HABITS:  None.     CURRENT MEDICATIONS:  Synthroid 224 mcg daily for 3 days followed by 336 mcg   for 1 day in alternating cycles.  Zoloft 200 mg per day and albuterol p.r.n.     PHYSICAL EXAMINATION:  GENERAL:  Well-developed, well-nourished, obese female, alert and oriented x3.  HEENT:  Within normal limits.  Pupils equal,  react to light and accommodation.    Extraocular movements symmetrical.  Ear, nose and throat exam grossly within   normal limits.  LUNGS:  Clear.  HEART:  Regular rate and rhythm, normal S1 and S2.  No S3, S4 or murmurs.  ABDOMEN:  Soft, obese female.  EXTREMITIES:  1-2+ pitting edema.  Reflexes 3+.  NEUROLOGIC:  Cranial nerves II-XII grossly intact.     DIAGNOSTIC DATA:  Bedside ultrasound reveals parsons fetus, vertex, JESSICA   increased at 22.  The estimated fetal weight 1412 grams and ultrasound   adjusted age 29 weeks and 6 days.  The fetal anatomy was limited due to   maternal obesity but kidneys, bladder, 4-chamber view of the heart were   obtained, lateral ventricle could not be well visualized.     ASSESSMENT:  1.  Intrauterine pregnancy at 28 weeks 6 days.  2.  Probable gestational hypertension versus preeclampsia.  3.  Intracranial hypertension.  4.  Major depressive disorder.  5.  Hypothyroidism.  6.  History of asthma.  7.  Multiple drug allergies.  8.  Obesity.     LABORATORY STUDIES:  Show hemoglobin 11.5, hematocrit 35.6%, platelet count   187,000.  Chemistry panel shows BUN to be 6, creatinine 0.63, AST and ALT are   both normal.  Protein creatinine ratio was 0.2 at [facility] with 24-hour   urine total protein is pending.     PLAN:  Complete corticosteroids.  We will leave her on magnesium sulfate   overnight and reassess her tomorrow.  The patient counseled on her situation.    Primary goal is to complete corticosteroids and to prolong the pregnancy as   best as possible.  The patient has been advised she may be in for prolonged   stay; however, this is not definitive at this time.  All questions answered to   her satisfaction.    Time: 100 minutes        ______________________________  MD DIEGO MAI/HUEY    DD:  12/02/2022 19:17  DT:  12/02/2022 20:06    Job#:  [id number]

## 2022-12-04 LAB
ALBUMIN SERPL BCP-MCNC: 3 G/DL (ref 3.2–4.9)
ALBUMIN/GLOB SERPL: 1.3 G/DL
ALP SERPL-CCNC: 88 U/L (ref 30–99)
ALT SERPL-CCNC: 11 U/L (ref 2–50)
ANION GAP SERPL CALC-SCNC: 11 MMOL/L (ref 7–16)
AST SERPL-CCNC: 14 U/L (ref 12–45)
BILIRUB SERPL-MCNC: <0.2 MG/DL (ref 0.1–1.5)
BUN SERPL-MCNC: 9 MG/DL (ref 8–22)
CALCIUM SERPL-MCNC: 7.6 MG/DL (ref 8.5–10.5)
CHLORIDE SERPL-SCNC: 103 MMOL/L (ref 96–112)
CO2 SERPL-SCNC: 22 MMOL/L (ref 20–33)
CREAT SERPL-MCNC: 0.65 MG/DL (ref 0.5–1.4)
ERYTHROCYTE [DISTWIDTH] IN BLOOD BY AUTOMATED COUNT: 43.5 FL (ref 35.9–50)
GFR SERPLBLD CREATININE-BSD FMLA CKD-EPI: 120 ML/MIN/1.73 M 2
GLOBULIN SER CALC-MCNC: 2.3 G/DL (ref 1.9–3.5)
GLUCOSE BLD STRIP.AUTO-MCNC: 113 MG/DL (ref 65–99)
GLUCOSE BLD STRIP.AUTO-MCNC: 129 MG/DL (ref 65–99)
GLUCOSE BLD STRIP.AUTO-MCNC: 135 MG/DL (ref 65–99)
GLUCOSE BLD STRIP.AUTO-MCNC: 136 MG/DL (ref 65–99)
GLUCOSE SERPL-MCNC: 165 MG/DL (ref 65–99)
HCT VFR BLD AUTO: 32.7 % (ref 37–47)
HGB BLD-MCNC: 10.7 G/DL (ref 12–16)
MAGNESIUM SERPL-MCNC: 5.4 MG/DL (ref 1.5–2.5)
MCH RBC QN AUTO: 27.4 PG (ref 27–33)
MCHC RBC AUTO-ENTMCNC: 32.7 G/DL (ref 33.6–35)
MCV RBC AUTO: 83.8 FL (ref 81.4–97.8)
PLATELET # BLD AUTO: 192 K/UL (ref 164–446)
PMV BLD AUTO: 12.9 FL (ref 9–12.9)
POTASSIUM SERPL-SCNC: 4.1 MMOL/L (ref 3.6–5.5)
PROT SERPL-MCNC: 5.3 G/DL (ref 6–8.2)
RBC # BLD AUTO: 3.9 M/UL (ref 4.2–5.4)
SODIUM SERPL-SCNC: 136 MMOL/L (ref 135–145)
WBC # BLD AUTO: 17 K/UL (ref 4.8–10.8)

## 2022-12-04 PROCEDURE — 700102 HCHG RX REV CODE 250 W/ 637 OVERRIDE(OP): Performed by: OBSTETRICS & GYNECOLOGY

## 2022-12-04 PROCEDURE — 80053 COMPREHEN METABOLIC PANEL: CPT

## 2022-12-04 PROCEDURE — A9270 NON-COVERED ITEM OR SERVICE: HCPCS | Performed by: OBSTETRICS & GYNECOLOGY

## 2022-12-04 PROCEDURE — 700105 HCHG RX REV CODE 258: Performed by: OBSTETRICS & GYNECOLOGY

## 2022-12-04 PROCEDURE — 700111 HCHG RX REV CODE 636 W/ 250 OVERRIDE (IP): Performed by: OBSTETRICS & GYNECOLOGY

## 2022-12-04 PROCEDURE — 82962 GLUCOSE BLOOD TEST: CPT | Mod: 91

## 2022-12-04 PROCEDURE — 83735 ASSAY OF MAGNESIUM: CPT

## 2022-12-04 PROCEDURE — 85027 COMPLETE CBC AUTOMATED: CPT

## 2022-12-04 PROCEDURE — 59025 FETAL NON-STRESS TEST: CPT

## 2022-12-04 PROCEDURE — 302790 HCHG STAT ANTEPARTUM CARE, DAILY

## 2022-12-04 PROCEDURE — 36415 COLL VENOUS BLD VENIPUNCTURE: CPT

## 2022-12-04 PROCEDURE — 770002 HCHG ROOM/CARE - OB PRIVATE (112)

## 2022-12-04 RX ORDER — CALCIUM CARBONATE 500 MG/1
500 TABLET, CHEWABLE ORAL 4 TIMES DAILY PRN
Status: DISCONTINUED | OUTPATIENT
Start: 2022-12-04 | End: 2022-12-13 | Stop reason: HOSPADM

## 2022-12-04 RX ORDER — LABETALOL 100 MG/1
200 TABLET, FILM COATED ORAL EVERY 12 HOURS
Status: DISCONTINUED | OUTPATIENT
Start: 2022-12-04 | End: 2022-12-05

## 2022-12-04 RX ADMIN — FEXOFENADINE HCL 60 MG: 60 TABLET, FILM COATED ORAL at 06:51

## 2022-12-04 RX ADMIN — LABETALOL HYDROCHLORIDE 200 MG: 100 TABLET, FILM COATED ORAL at 17:31

## 2022-12-04 RX ADMIN — ONDANSETRON 4 MG: 2 INJECTION INTRAMUSCULAR; INTRAVENOUS at 07:15

## 2022-12-04 RX ADMIN — OMEPRAZOLE 20 MG: 20 CAPSULE, DELAYED RELEASE ORAL at 06:50

## 2022-12-04 RX ADMIN — INSULIN HUMAN 12 UNITS: 100 INJECTION, SUSPENSION SUBCUTANEOUS at 17:32

## 2022-12-04 RX ADMIN — LEVOTHYROXINE SODIUM 224 MCG: 0.11 TABLET ORAL at 06:21

## 2022-12-04 RX ADMIN — LABETALOL HYDROCHLORIDE 100 MG: 100 TABLET, FILM COATED ORAL at 06:50

## 2022-12-04 RX ADMIN — CALCIUM CARBONATE 500 MG: 500 TABLET, CHEWABLE ORAL at 01:52

## 2022-12-04 RX ADMIN — SERTRALINE 200 MG: 50 TABLET, FILM COATED ORAL at 06:50

## 2022-12-04 RX ADMIN — SODIUM CHLORIDE, POTASSIUM CHLORIDE, SODIUM LACTATE AND CALCIUM CHLORIDE 1000 ML: 600; 310; 30; 20 INJECTION, SOLUTION INTRAVENOUS at 06:18

## 2022-12-04 RX ADMIN — ACETAMINOPHEN 1000 MG: 500 TABLET ORAL at 11:28

## 2022-12-04 RX ADMIN — MAGNESIUM SULFATE HEPTAHYDRATE 2 G/HR: 40 INJECTION, SOLUTION INTRAVENOUS at 06:23

## 2022-12-04 ASSESSMENT — PATIENT HEALTH QUESTIONNAIRE - PHQ9
2. FEELING DOWN, DEPRESSED, IRRITABLE, OR HOPELESS: NOT AT ALL
SUM OF ALL RESPONSES TO PHQ9 QUESTIONS 1 AND 2: 0
1. LITTLE INTEREST OR PLEASURE IN DOING THINGS: NOT AT ALL

## 2022-12-04 ASSESSMENT — PAIN DESCRIPTION - PAIN TYPE: TYPE: ACUTE PAIN

## 2022-12-04 NOTE — CARE PLAN
Problem: Knowledge Deficit - L&D  Goal: Patient and family/caregivers will demonstrate understanding of plan of care, disease process/condition, diagnostic tests and medications  Outcome: Progressing  Note: POC discussed, pt verbalized understanding     Problem: Psychosocial - L&D  Goal: Patient's level of anxiety will decrease  Outcome: Progressing  Goal: Patient will be able to discuss coping skills during hospitalization  Outcome: Progressing  Note: Patient encouraged to voice feelings and concerns     Problem: Cardiac Output  Goal: Patient will remain normotensive throughout hospitalization  Outcome: Progressing  Note: Patient has maintained BP readings WNL throughout shift     Problem: Risk for Injury  Goal: Patient and fetus will be free of preventable injury/complications  Outcome: Progressing  Note: Continuous monitoring of uterine activity and fetal well being per order.     The patient is Watcher - Medium risk of patient condition declining or worsening    Shift Goals  Clinical Goals: BP WNL, mag checks  Patient Goals: healthy mom and healthy baby  Family Goals: support    Progress made toward(s) clinical / shift goals:  progressing    Patient is not progressing towards the following goals:

## 2022-12-04 NOTE — PROGRESS NOTES
0737dr. Jay notified of pt fastng and poc discussed.     0900 O2 decreaed to 4L    1224 Dr. Jay made aware of pt 1h post perandial from lunch. No new prders    1300 Orders received to d/c magnesium      1310 O2 decreased to 2L and eventually weaned off.

## 2022-12-04 NOTE — PROGRESS NOTES
S: No complaints  O: Patient Vitals for the past 24 hrs:   BP Temp Temp src Pulse Resp SpO2   12/04/22 1200 (!) 148/81 36.2 °C (97.2 °F) Temporal 82 18 95 %   12/04/22 1105 (!) 150/77 -- -- 83 -- 97 %   12/04/22 1010 (!) 140/75 -- -- 93 -- 98 %   12/04/22 0910 134/75 -- -- 77 -- 97 %   12/04/22 0804 130/73 36.3 °C (97.3 °F) Temporal 83 18 97 %   12/04/22 0700 (!) 150/74 -- -- 95 -- 96 %   12/04/22 0600 138/65 -- -- 86 -- 94 %   12/04/22 0500 123/58 -- -- 95 -- 93 %   12/04/22 0416 122/61 36.2 °C (97.2 °F) Temporal 78 19 93 %   12/04/22 0300 125/65 -- -- 78 -- 93 %   12/04/22 0200 135/70 -- -- 77 -- 94 %   12/04/22 0100 123/64 -- -- 85 -- 93 %   12/03/22 2357 125/68 36.3 °C (97.4 °F) Temporal 90 20 94 %   12/03/22 2300 120/62 -- -- 87 -- 92 %   12/03/22 2200 130/71 -- -- 86 -- 95 %   12/03/22 2100 (!) 149/67 -- -- 92 -- 95 %   12/03/22 2000 137/65 36.2 °C (97.2 °F) Temporal 90 (!) 31 94 %   12/03/22 1900 (!) 143/71 -- -- 91 -- 95 %   12/03/22 1829 -- -- -- (!) 101 -- 94 %   12/03/22 1826 -- -- -- 89 -- --   12/03/22 1824 -- -- -- 94 -- 95 %   12/03/22 1822 -- -- -- 95 -- --   12/03/22 1819 -- -- -- 91 -- 89 %   12/03/22 1818 -- -- -- 100 -- --   12/03/22 1814 -- -- -- 97 -- 95 %   12/03/22 1810 -- -- -- 98 -- --   12/03/22 1809 -- -- -- 99 -- 94 %   12/03/22 1806 -- -- -- 96 -- --   12/03/22 1803 -- -- -- 91 -- 93 %   12/03/22 1802 -- -- -- 98 -- --   12/03/22 1759 -- -- -- (!) 101 -- 94 %   12/03/22 1758 -- -- -- 92 -- --   12/03/22 1754 -- -- -- (!) 101 -- 96 %   12/03/22 1750 -- -- -- (!) 103 -- --   12/03/22 1749 -- -- -- 100 -- 95 %   12/03/22 1746 -- -- -- 87 -- --   12/03/22 1744 -- -- -- 85 -- 95 %   12/03/22 1742 -- -- -- (!) 104 -- --   12/03/22 1739 -- -- -- 98 -- 95 %   12/03/22 1738 -- -- -- 96 -- --   12/03/22 1734 -- -- -- (!) 101 -- 95 %   12/03/22 1730 -- -- -- (!) 102 -- --   12/03/22 1729 -- -- -- (!) 101 -- 95 %   12/03/22 1726 -- -- -- 98 -- --   12/03/22 1724 -- -- -- 99 -- 94 %   12/03/22 1722  -- -- -- 94 -- --   12/03/22 1719 (!) 140/72 -- -- 87 -- 92 %   12/03/22 1718 -- -- -- 91 -- --   12/03/22 1714 -- -- -- 98 -- 95 %   12/03/22 1710 -- -- -- 95 -- --   12/03/22 1709 -- -- -- 98 -- 95 %   12/03/22 1706 -- -- -- 89 -- --   12/03/22 1704 -- -- -- 89 -- 95 %   12/03/22 1702 -- -- -- 92 -- --   12/03/22 1659 -- -- -- 98 -- 95 %   12/03/22 1658 -- -- -- 95 -- --   12/03/22 1654 -- -- -- 99 -- --   12/03/22 1653 -- -- -- 93 -- 88 %   12/03/22 1650 -- -- -- 84 -- --   12/03/22 1649 -- -- -- 98 -- 95 %   12/03/22 1646 -- -- -- 89 -- --   12/03/22 1644 -- -- -- 86 -- 96 %   12/03/22 1642 -- -- -- 89 -- --   12/03/22 1639 -- -- -- 98 -- 95 %   12/03/22 1638 -- -- -- 93 -- --   12/03/22 1634 -- -- -- 97 -- 95 %   12/03/22 1630 -- -- -- 100 -- --   12/03/22 1629 -- -- -- (!) 106 -- 96 %   12/03/22 1626 -- -- -- 84 -- --   12/03/22 1624 -- -- -- 95 -- 94 %   12/03/22 1622 -- -- -- 86 -- --   12/03/22 1619 (!) 143/82 -- -- 85 -- 94 %   12/03/22 1618 -- -- -- 89 -- --   12/03/22 1614 -- -- -- 90 -- 95 %   12/03/22 1610 -- -- -- 93 -- --   12/03/22 1609 -- -- -- 90 -- 96 %   12/03/22 1606 -- -- -- 86 -- --   12/03/22 1604 -- -- -- 81 -- 96 %   12/03/22 1602 -- -- -- 94 -- --   12/03/22 1600 (!) 140/76 36.2 °C (97.1 °F) Temporal 91 17 96 %   12/03/22 1558 -- -- -- 89 -- --   12/03/22 1554 -- -- -- 89 -- 97 %   12/03/22 1550 -- -- -- 98 -- --   12/03/22 1549 -- -- -- 87 -- 96 %   12/03/22 1546 -- -- -- 88 -- --   12/03/22 1544 -- -- -- 87 -- 96 %   12/03/22 1542 -- -- -- 93 -- --   12/03/22 1539 -- -- -- 88 -- 96 %   12/03/22 1538 -- -- -- 92 -- --   12/03/22 1534 -- -- -- 97 -- 95 %   12/03/22 1530 -- -- -- 91 -- --   12/03/22 1529 -- -- -- 91 -- 96 %   12/03/22 1526 -- -- -- 92 -- --   12/03/22 1524 -- -- -- 89 -- 96 %   12/03/22 1522 -- -- -- 92 -- --   12/03/22 1519 -- -- -- 79 -- 94 %   12/03/22 1518 -- -- -- 88 -- --   12/03/22 1513 -- -- -- 87 -- --   12/03/22 1512 -- -- -- 82 -- 96 %   12/03/22 1509 -- -- --  96 -- --   12/03/22 1507 -- -- -- 93 -- 96 %   12/03/22 1505 -- -- -- 99 -- --   12/03/22 1502 -- -- -- 91 -- 96 %   12/03/22 1500 133/73 -- -- 88 -- 97 %   12/03/22 1457 -- -- -- 89 -- --   12/03/22 1453 -- -- -- 91 -- --   12/03/22 1452 -- -- -- 92 -- 97 %   12/03/22 1449 -- -- -- 88 -- --   12/03/22 1447 -- -- -- 86 -- 95 %   12/03/22 1445 -- -- -- 98 -- --   12/03/22 1442 -- -- -- 89 -- 97 %   12/03/22 1441 -- -- -- 95 -- --   12/03/22 1437 -- -- -- 84 -- 96 %   12/03/22 1433 -- -- -- 100 -- --   12/03/22 1432 -- -- -- (!) 103 -- 97 %   12/03/22 1429 -- -- -- 91 -- --   12/03/22 1427 -- -- -- 96 -- 97 %   12/03/22 1425 -- -- -- 85 -- --   12/03/22 1422 -- -- -- 86 -- 95 %   12/03/22 1421 -- -- -- 87 -- --   12/03/22 1419 -- -- -- 82 -- --   12/03/22 1417 -- -- -- 88 -- 98 %   12/03/22 1413 -- -- -- (!) 102 -- --   12/03/22 1412 -- -- -- 94 -- 96 %   12/03/22 1409 -- -- -- 92 -- --   12/03/22 1407 -- -- -- 97 -- 96 %   12/03/22 1405 -- -- -- 95 -- --   12/03/22 1402 -- -- -- 97 -- 96 %   12/03/22 1400 133/70 -- -- (!) 101 -- 98 %   12/03/22 1357 -- -- -- 98 -- --   12/03/22 1353 -- -- -- 94 -- --   12/03/22 1352 -- -- -- 89 -- 95 %   12/03/22 1349 -- -- -- 88 -- --   12/03/22 1347 -- -- -- 95 -- 98 %   12/03/22 1345 -- -- -- 99 -- --   12/03/22 1342 -- -- -- 99 -- 98 %   12/03/22 1341 -- -- -- 94 -- --   12/03/22 1337 -- -- -- 99 -- 98 %   12/03/22 1333 -- -- -- 89 -- --   12/03/22 1332 -- -- -- 86 -- 97 %   12/03/22 1329 -- -- -- 90 -- --   12/03/22 1327 -- -- -- 95 -- 98 %   12/03/22 1325 -- -- -- 91 -- --   12/03/22 1322 -- -- -- 94 -- 98 %   12/03/22 1321 -- -- -- 100 -- --   12/03/22 1319 -- -- -- 82 -- --   12/03/22 1317 -- -- -- (!) 25 -- 100 %   12/03/22 1308 -- -- -- 95 -- 90 %   12/03/22 1304 -- -- -- (!) 103 -- --   12/03/22 1303 -- -- -- 95 -- 98 %   12/03/22 1302 -- 36.4 °C (97.6 °F) Temporal -- 17 98 %   12/03/22 1300 -- -- -- 96 -- --   12/03/22 1258 -- -- -- 95 -- --   12/03/22 1256 -- -- -- (!)  25 -- --   12/03/22 1253 -- -- -- (!) 106 -- 98 %   12/03/22 1252 -- -- -- 89 -- --     EFM: Moderate variability and accelerations present   Latest Reference Range & Units 12/04/22 04:33   WBC 4.8 - 10.8 K/uL 17.0 (H)   RBC 4.20 - 5.40 M/uL 3.90 (L)   Hemoglobin 12.0 - 16.0 g/dL 10.7 (L)   Hematocrit 37.0 - 47.0 % 32.7 (L)   MCV 81.4 - 97.8 fL 83.8   MCH 27.0 - 33.0 pg 27.4   MCHC 33.6 - 35.0 g/dL 32.7 (L)   RDW 35.9 - 50.0 fL 43.5   Platelet Count 164 - 446 K/uL 192   MPV 9.0 - 12.9 fL 12.9   Sodium 135 - 145 mmol/L 136   Potassium 3.6 - 5.5 mmol/L 4.1   Chloride 96 - 112 mmol/L 103   Co2 20 - 33 mmol/L 22   Anion Gap 7.0 - 16.0  11.0   Glucose 65 - 99 mg/dL 165 (H)   Bun 8 - 22 mg/dL 9   Creatinine 0.50 - 1.40 mg/dL 0.65   GFR (CKD-EPI) >60 mL/min/1.73 m 2 120   Calcium 8.5 - 10.5 mg/dL 7.6 (L)   AST(SGOT) 12 - 45 U/L 14   ALT(SGPT) 2 - 50 U/L 11   Alkaline Phosphatase 30 - 99 U/L 88   Total Bilirubin 0.1 - 1.5 mg/dL <0.2   Albumin 3.2 - 4.9 g/dL 3.0 (L)   Total Protein 6.0 - 8.2 g/dL 5.3 (L)   Globulin 1.9 - 3.5 g/dL 2.3   A-G Ratio g/dL 1.3   Magnesium 1.5 - 2.5 mg/dL 5.4 (H)   (H): Data is abnormally high  (L): Data is abnormally low   Latest Reference Range & Units 12/04/22 06:27 12/04/22 12:13   POC Glucose, Blood 65 - 99 mg/dL 129 (H) 135 (H)   (H): Data is abnormally high    Urine total protein 12/3/22:  316.0 High     A: IUP 29 1/7 weeks       Preclampsia       Gestational hypertension  P:  Repeat labs in am.        D/C magnesium this afternoon    Time: 15 minutes

## 2022-12-04 NOTE — DISCHARGE PLANNING
:    Referral: History of depression, anxiety, suicide attempt in 2015, and MOB scored a 7 on the PQH screen.    Intervention:  Pt is currently 28 weeks pregnant with her first baby.  The FOB is Sloan Ruiz and parents live at [address] [place] [address].  MOB is admitted for pre-eclampsia and gestational diabetes.  MOB has been evaluated by Psychiatry and cleared.  She is currently taking 200 mg of Zoloft.  SW met with Pt and provided counseling and support group resources.  SW also discussed the [facility] and explained that a referral can be made if infant is in the NICU.  MOB has the support of her brother who is here visiting from [place].  No further needs identified at this time.      Discharge Planning Assessment Ante Partum    Reason for Referral: History of depression, anxiety, past suicide attempt, and scored a 7 on the PQH screen  Address: [address] [place] [address]  Phone: [phone]  Type of Living Situation: living with FOB  Mom Diagnosis: Pregnancy-28 weeks, pre-eclampsia, gestational diabetes  Primary Language: English    Father of the Baby: Sloan Ruiz  Involved in baby’s care? Yes  Contact Information: [phone]    Prenatal Care: Yes-Dr. Jay  Mom's PCP: Dr. Bowen Alatorre  Support System: FOB and MOB's brother    Mom's Insurance: [employer]  Mother Employed/School: [employer]  Other children in the home/names & ages: first baby    Financial Hardship/Income: No   Mom's Mental status: alert and oriented    CPS History: No  Psychiatric History: history of depression, anxiety, and past suicide attempt.  MOB is currently taking Zoloft 200 mg and is doing well.  Psychiatry has evaluated Pt and cleared for discharge  Domestic Violence History: No  Drug/ETOH History: No    Resources Provided: post partum support and counseling resources  Referrals Made: discussed the [facility] and offered to make a referral if  needed.  No referral made currently     Ongoing Plan: Continue to follow and assist as needed

## 2022-12-04 NOTE — PROGRESS NOTES
1900- Report received from Jennifer ALEX, POC discussed, care assumed.   1930- Call made to Dr. Jay, orders clarified. Will give Labetalol know and schedule NPH nightly.     2100- Pt reports +FM, denies utx, LOF or VB, states that she has a HA (medicated per MAR), denies vision changes/RUQ pain or n/v. Call light within reach, pt denies any needs at this time.     0700- Report given to Jennifer ALEX, POC discussed, care relinquished.

## 2022-12-05 LAB
ALBUMIN SERPL BCP-MCNC: 3.1 G/DL (ref 3.2–4.9)
ALBUMIN/GLOB SERPL: 1.4 G/DL
ALP SERPL-CCNC: 82 U/L (ref 30–99)
ALT SERPL-CCNC: 10 U/L (ref 2–50)
ANION GAP SERPL CALC-SCNC: 11 MMOL/L (ref 7–16)
AST SERPL-CCNC: 12 U/L (ref 12–45)
BILIRUB SERPL-MCNC: <0.2 MG/DL (ref 0.1–1.5)
BUN SERPL-MCNC: 13 MG/DL (ref 8–22)
CALCIUM SERPL-MCNC: 8.4 MG/DL (ref 8.5–10.5)
CHLORIDE SERPL-SCNC: 105 MMOL/L (ref 96–112)
CO2 SERPL-SCNC: 23 MMOL/L (ref 20–33)
CREAT SERPL-MCNC: 0.76 MG/DL (ref 0.5–1.4)
ERYTHROCYTE [DISTWIDTH] IN BLOOD BY AUTOMATED COUNT: 43.9 FL (ref 35.9–50)
GFR SERPLBLD CREATININE-BSD FMLA CKD-EPI: 107 ML/MIN/1.73 M 2
GLOBULIN SER CALC-MCNC: 2.2 G/DL (ref 1.9–3.5)
GLUCOSE BLD STRIP.AUTO-MCNC: 119 MG/DL (ref 65–99)
GLUCOSE BLD STRIP.AUTO-MCNC: 132 MG/DL (ref 65–99)
GLUCOSE BLD STRIP.AUTO-MCNC: 88 MG/DL (ref 65–99)
GLUCOSE BLD STRIP.AUTO-MCNC: 96 MG/DL (ref 65–99)
GLUCOSE SERPL-MCNC: 90 MG/DL (ref 65–99)
HCT VFR BLD AUTO: 32.9 % (ref 37–47)
HGB BLD-MCNC: 10.5 G/DL (ref 12–16)
MCH RBC QN AUTO: 26.9 PG (ref 27–33)
MCHC RBC AUTO-ENTMCNC: 31.9 G/DL (ref 33.6–35)
MCV RBC AUTO: 84.4 FL (ref 81.4–97.8)
PLATELET # BLD AUTO: 186 K/UL (ref 164–446)
PMV BLD AUTO: 12.9 FL (ref 9–12.9)
POTASSIUM SERPL-SCNC: 4.6 MMOL/L (ref 3.6–5.5)
PROT SERPL-MCNC: 5.3 G/DL (ref 6–8.2)
RBC # BLD AUTO: 3.9 M/UL (ref 4.2–5.4)
SODIUM SERPL-SCNC: 139 MMOL/L (ref 135–145)
WBC # BLD AUTO: 14.1 K/UL (ref 4.8–10.8)

## 2022-12-05 PROCEDURE — 59025 FETAL NON-STRESS TEST: CPT

## 2022-12-05 PROCEDURE — 700102 HCHG RX REV CODE 250 W/ 637 OVERRIDE(OP): Performed by: OBSTETRICS & GYNECOLOGY

## 2022-12-05 PROCEDURE — A9270 NON-COVERED ITEM OR SERVICE: HCPCS | Performed by: OBSTETRICS & GYNECOLOGY

## 2022-12-05 PROCEDURE — 85027 COMPLETE CBC AUTOMATED: CPT

## 2022-12-05 PROCEDURE — 80053 COMPREHEN METABOLIC PANEL: CPT

## 2022-12-05 PROCEDURE — 770002 HCHG ROOM/CARE - OB PRIVATE (112)

## 2022-12-05 PROCEDURE — 36415 COLL VENOUS BLD VENIPUNCTURE: CPT

## 2022-12-05 PROCEDURE — 302790 HCHG STAT ANTEPARTUM CARE, DAILY

## 2022-12-05 PROCEDURE — 82962 GLUCOSE BLOOD TEST: CPT | Mod: 91

## 2022-12-05 RX ORDER — LABETALOL 100 MG/1
200 TABLET, FILM COATED ORAL EVERY 8 HOURS
Status: DISCONTINUED | OUTPATIENT
Start: 2022-12-05 | End: 2022-12-13 | Stop reason: HOSPADM

## 2022-12-05 RX ADMIN — OMEPRAZOLE 20 MG: 20 CAPSULE, DELAYED RELEASE ORAL at 08:21

## 2022-12-05 RX ADMIN — SERTRALINE 200 MG: 50 TABLET, FILM COATED ORAL at 08:22

## 2022-12-05 RX ADMIN — LABETALOL HYDROCHLORIDE 200 MG: 100 TABLET, FILM COATED ORAL at 16:59

## 2022-12-05 RX ADMIN — LABETALOL HYDROCHLORIDE 200 MG: 100 TABLET, FILM COATED ORAL at 08:22

## 2022-12-05 RX ADMIN — FEXOFENADINE HCL 60 MG: 60 TABLET, FILM COATED ORAL at 09:45

## 2022-12-05 RX ADMIN — LEVOTHYROXINE SODIUM 224 MCG: 0.11 TABLET ORAL at 08:21

## 2022-12-05 RX ADMIN — INSULIN HUMAN 12 UNITS: 100 INJECTION, SUSPENSION SUBCUTANEOUS at 18:14

## 2022-12-05 ASSESSMENT — PATIENT HEALTH QUESTIONNAIRE - PHQ9
6. FEELING BAD ABOUT YOURSELF - OR THAT YOU ARE A FAILURE OR HAVE LET YOURSELF OR YOUR FAMILY DOWN: NOT AL ALL
7. TROUBLE CONCENTRATING ON THINGS, SUCH AS READING THE NEWSPAPER OR WATCHING TELEVISION: SEVERAL DAYS
3. TROUBLE FALLING OR STAYING ASLEEP OR SLEEPING TOO MUCH: NEARLY EVERY DAY
8. MOVING OR SPEAKING SO SLOWLY THAT OTHER PEOPLE COULD HAVE NOTICED. OR THE OPPOSITE, BEING SO FIGETY OR RESTLESS THAT YOU HAVE BEEN MOVING AROUND A LOT MORE THAN USUAL: NOT AT ALL
2. FEELING DOWN, DEPRESSED, IRRITABLE, OR HOPELESS: NOT AT ALL
SUM OF ALL RESPONSES TO PHQ9 QUESTIONS 1 AND 2: 0
5. POOR APPETITE OR OVEREATING: NOT AT ALL
1. LITTLE INTEREST OR PLEASURE IN DOING THINGS: NOT AT ALL
4. FEELING TIRED OR HAVING LITTLE ENERGY: MORE THAN HALF THE DAYS

## 2022-12-05 NOTE — CARE PLAN
Problem: Knowledge Deficit - L&D  Goal: Patient and family/caregivers will demonstrate understanding of plan of care, disease process/condition, diagnostic tests and medications  Outcome: Progressing     Problem: Provide Safe Environment  Goal: Suicide environmental safety, protocols, policies, and practices will be implemented  Outcome: Progressing     Problem: Psychosocial  Goal: Patient's ability to identify and develop effective coping behaviors will improve  Outcome: Progressing     Problem: Pain  Goal: Patient's pain will be alleviated or reduced to the patient’s comfort goal  Outcome: Progressing   The patient is Stable - Low risk of patient condition declining or worsening    Shift Goals  Clinical Goals: maintain pregnancy  Patient Goals: manage BS  Family Goals: support

## 2022-12-05 NOTE — PROGRESS NOTES
07: Received bedside report from Vannesa RAPP; PT is a , EDC of  with a GA of 29w2d, RN assuming care of patient; all questions answered; pt resting comfortably in bed with significant other at bedside; denies needs at this time; VS in stable condition; pt educated regarding call light system; call light and pt belongings in reach, pt encouraged to call RN for any needs, wants, desires or concerns. Whiteboard updated. POC discussed.     929: Notified Pierre CAMPBELL about high BP. Received orders.     : Gave report to Vannessa RAPP. POC discussed.

## 2022-12-05 NOTE — CARE PLAN
Problem: Knowledge Deficit - L&D  Goal: Patient and family/caregivers will demonstrate understanding of plan of care, disease process/condition, diagnostic tests and medications  Outcome: Progressing     Problem: Risk for Excess Fluid Volume  Goal: Patient will demonstrate pulse, blood pressure and neurologic signs within expected ranges and without any respiratory complications  Outcome: Progressing     Problem: Provide Safe Environment  Goal: Suicide environmental safety, protocols, policies, and practices will be implemented  Outcome: Progressing     Problem: Psychosocial  Goal: Patient's ability to identify and develop effective coping behaviors will improve  Outcome: Progressing  Goal: Patient's ability to identify and utilize available support systems will improve  Outcome: Progressing     Problem: Skin Integrity  Goal: Skin integrity is maintained or improved  Outcome: Progressing     Problem: Psychosocial - L&D  Goal: Patient's level of anxiety will decrease  Outcome: Progressing  Goal: Patient will be able to discuss coping skills during hospitalization  Outcome: Progressing  Goal: Patient's ability to re-evaluate and adapt role responsibilities will improve  Outcome: Progressing  Goal: Spiritual and cultural needs incorporated into hospitalization  Outcome: Progressing     Problem: Cardiac Output  Goal: Patient will remain normotensive throughout hospitalization  Outcome: Progressing     Problem: Pain  Goal: Patient's pain will be alleviated or reduced to the patient’s comfort goal  Outcome: Progressing     Problem: Risk for Fluid Imbalance  Goal: Patient's fluid volume balance will be maintained or improve  Outcome: Progressing     Problem: Risk for Infection and Impaired Wound Healing  Goal: Patient will remain free from infection  Outcome: Progressing  Goal: Patient's wound/surgical incision will decrease in size and heals properly  Outcome: Progressing     Problem: Risk for Injury  Goal: Patient and  fetus will be free of preventable injury/complications  Outcome: Progressing     Problem: Risk for Venous Thromboembolism (VTE)  Goal: VTE prevention measures will be implemented and patient will remain free from VTE  Outcome: Progressing     Problem: Discharge Barriers/Planning  Goal: Patient's continuum of care needs are met  Outcome: Progressing   The patient is Stable - Low risk of patient condition declining or worsening    Shift Goals  Clinical Goals: Maintain pregnancy  Patient Goals: Rest  Family Goals: Support    Progress made toward(s) clinical / shift goals:  Patient remained pregnant and rested through the night

## 2022-12-06 LAB
GLUCOSE BLD STRIP.AUTO-MCNC: 101 MG/DL (ref 65–99)
GLUCOSE BLD STRIP.AUTO-MCNC: 88 MG/DL (ref 65–99)
GLUCOSE BLD STRIP.AUTO-MCNC: 88 MG/DL (ref 65–99)
GLUCOSE BLD STRIP.AUTO-MCNC: 96 MG/DL (ref 65–99)

## 2022-12-06 PROCEDURE — 59025 FETAL NON-STRESS TEST: CPT

## 2022-12-06 PROCEDURE — A9270 NON-COVERED ITEM OR SERVICE: HCPCS | Performed by: OBSTETRICS & GYNECOLOGY

## 2022-12-06 PROCEDURE — 700102 HCHG RX REV CODE 250 W/ 637 OVERRIDE(OP): Performed by: OBSTETRICS & GYNECOLOGY

## 2022-12-06 PROCEDURE — 302790 HCHG STAT ANTEPARTUM CARE, DAILY

## 2022-12-06 PROCEDURE — 770002 HCHG ROOM/CARE - OB PRIVATE (112)

## 2022-12-06 PROCEDURE — 82962 GLUCOSE BLOOD TEST: CPT | Mod: 91

## 2022-12-06 RX ORDER — FEXOFENADINE HCL 60 MG/1
180 TABLET, FILM COATED ORAL DAILY
Status: DISCONTINUED | OUTPATIENT
Start: 2022-12-06 | End: 2022-12-13 | Stop reason: HOSPADM

## 2022-12-06 RX ADMIN — LEVOTHYROXINE SODIUM 336 MCG: 0.11 TABLET ORAL at 06:14

## 2022-12-06 RX ADMIN — OMEPRAZOLE 20 MG: 20 CAPSULE, DELAYED RELEASE ORAL at 06:17

## 2022-12-06 RX ADMIN — LABETALOL HYDROCHLORIDE 200 MG: 100 TABLET, FILM COATED ORAL at 17:43

## 2022-12-06 RX ADMIN — LABETALOL HYDROCHLORIDE 200 MG: 100 TABLET, FILM COATED ORAL at 09:26

## 2022-12-06 RX ADMIN — LABETALOL HYDROCHLORIDE 200 MG: 100 TABLET, FILM COATED ORAL at 01:05

## 2022-12-06 RX ADMIN — SERTRALINE 200 MG: 50 TABLET, FILM COATED ORAL at 06:16

## 2022-12-06 RX ADMIN — FEXOFENADINE HCL 180 MG: 60 TABLET, FILM COATED ORAL at 06:16

## 2022-12-06 NOTE — PROGRESS NOTES
S: No complaints.  O: Patient Vitals for the past 24 hrs:   BP Temp Temp src Pulse Resp SpO2   12/05/22 1641 (!) 152/69 -- -- 64 -- --   12/05/22 1639 (!) 157/80 -- -- 60 -- --   12/05/22 1638 (!) 152/69 36.2 °C (97.1 °F) Temporal 79 17 97 %   12/05/22 1310 136/66 -- -- 70 -- --   12/05/22 1308 -- -- -- 71 -- 95 %   12/05/22 1307 136/66 36 °C (96.8 °F) Temporal 73 (!) 24 95 %   12/05/22 0912 (!) 150/71 -- -- 70 -- --   12/05/22 0911 -- -- -- 84 -- 97 %   12/05/22 0909 (!) 154/74 -- -- 74 -- --   12/05/22 0908 (!) 157/74 -- -- 69 -- --   12/05/22 0906 (!) 150/71 36.2 °C (97.2 °F) Temporal 77 18 96 %   12/05/22 0130 132/71 36.6 °C (97.9 °F) Temporal 80 18 --   12/04/22 2129 133/66 36.6 °C (97.9 °F) Temporal 68 18 --     EFM: Moderate variability and accelerations present.   Latest Reference Range & Units 12/05/22 07:26 12/05/22 10:11 12/05/22 14:10 12/05/22 18:12   POC Glucose, Blood 65 - 99 mg/dL 88 119 (H) 96 132 (H)   (H): Data is abnormally high     Latest Reference Range & Units 12/05/22 07:06   WBC 4.8 - 10.8 K/uL 14.1 (H)   RBC 4.20 - 5.40 M/uL 3.90 (L)   Hemoglobin 12.0 - 16.0 g/dL 10.5 (L)   Hematocrit 37.0 - 47.0 % 32.9 (L)   MCV 81.4 - 97.8 fL 84.4   MCH 27.0 - 33.0 pg 26.9 (L)   MCHC 33.6 - 35.0 g/dL 31.9 (L)   RDW 35.9 - 50.0 fL 43.9   Platelet Count 164 - 446 K/uL 186   MPV 9.0 - 12.9 fL 12.9   Sodium 135 - 145 mmol/L 139   Potassium 3.6 - 5.5 mmol/L 4.6   Chloride 96 - 112 mmol/L 105   Co2 20 - 33 mmol/L 23   Anion Gap 7.0 - 16.0  11.0   Glucose 65 - 99 mg/dL 90   Bun 8 - 22 mg/dL 13   Creatinine 0.50 - 1.40 mg/dL 0.76   GFR (CKD-EPI) >60 mL/min/1.73 m 2 107   Calcium 8.5 - 10.5 mg/dL 8.4 (L)   AST(SGOT) 12 - 45 U/L 12   ALT(SGPT) 2 - 50 U/L 10   Alkaline Phosphatase 30 - 99 U/L 82   Total Bilirubin 0.1 - 1.5 mg/dL <0.2   Albumin 3.2 - 4.9 g/dL 3.1 (L)   Total Protein 6.0 - 8.2 g/dL 5.3 (L)   Globulin 1.9 - 3.5 g/dL 2.2   A-G Ratio g/dL 1.4   (H): Data is abnormally high  (L): Data is abnormally low  A:  IUP 29 2/7 weeks      Preeclampsia, BP becoming unstable.      Gestational diabetes, improved control with diet and NPH at HS.  P: Recheck labs in am       Increase labetalol to 200 mg every 8 hours.       NPH scheduled for 2200, was being given at 1700.    Time:15 minutes

## 2022-12-06 NOTE — CARE PLAN
The patient is Stable - Low risk of patient condition declining or worsening    Shift Goals  Clinical Goals: Monitor BP  Patient Goals: rest  Family Goals: support    Progress made toward(s) clinical / shift goals:      Patient is not progressing towards the following goals:

## 2022-12-06 NOTE — CARE PLAN
Problem: Knowledge Deficit - L&D  Goal: Patient and family/caregivers will demonstrate understanding of plan of care, disease process/condition, diagnostic tests and medications  Outcome: Progressing   The patient is Stable - Low risk of patient condition declining or worsening    Shift Goals  Clinical Goals: maintain pregnancy  Patient Goals: manage BS  Family Goals: support    Progress made toward(s) clinical / shift goals:  Monitor blood pressure    Patient is not progressing towards the following goals:

## 2022-12-06 NOTE — PROGRESS NOTES
1900: Report received from Yuko SANDOVAL RN. POC discussed    1935: RN at bedside. Pt brother at bedside. Assessment done, pt denies VB, LOF, UCS, HA, epigastric pain. Pt states +FM. POC discussed, pt v/u. Externals placed for NST per orders    2009: NST reactive    2015: Dr Jay at bedside with pt, all questions answered. Allegra orders changed. NPH scheduled for 2200    0700: Report given to Jennifer DAVIS. POC discussed

## 2022-12-07 LAB
ALBUMIN SERPL BCP-MCNC: 3.2 G/DL (ref 3.2–4.9)
ALBUMIN/GLOB SERPL: 1.4 G/DL
ALP SERPL-CCNC: 87 U/L (ref 30–99)
ALT SERPL-CCNC: 14 U/L (ref 2–50)
ANION GAP SERPL CALC-SCNC: 9 MMOL/L (ref 7–16)
AST SERPL-CCNC: 15 U/L (ref 12–45)
BILIRUB SERPL-MCNC: <0.2 MG/DL (ref 0.1–1.5)
BUN SERPL-MCNC: 10 MG/DL (ref 8–22)
CALCIUM SERPL-MCNC: 8.9 MG/DL (ref 8.5–10.5)
CHLORIDE SERPL-SCNC: 105 MMOL/L (ref 96–112)
CO2 SERPL-SCNC: 23 MMOL/L (ref 20–33)
CREAT SERPL-MCNC: 0.69 MG/DL (ref 0.5–1.4)
ERYTHROCYTE [DISTWIDTH] IN BLOOD BY AUTOMATED COUNT: 42.6 FL (ref 35.9–50)
GFR SERPLBLD CREATININE-BSD FMLA CKD-EPI: 118 ML/MIN/1.73 M 2
GLOBULIN SER CALC-MCNC: 2.3 G/DL (ref 1.9–3.5)
GLUCOSE BLD STRIP.AUTO-MCNC: 107 MG/DL (ref 65–99)
GLUCOSE BLD STRIP.AUTO-MCNC: 143 MG/DL (ref 65–99)
GLUCOSE BLD STRIP.AUTO-MCNC: 66 MG/DL (ref 65–99)
GLUCOSE BLD STRIP.AUTO-MCNC: 94 MG/DL (ref 65–99)
GLUCOSE SERPL-MCNC: 71 MG/DL (ref 65–99)
HCT VFR BLD AUTO: 34.6 % (ref 37–47)
HGB BLD-MCNC: 11.3 G/DL (ref 12–16)
MCH RBC QN AUTO: 27.4 PG (ref 27–33)
MCHC RBC AUTO-ENTMCNC: 32.7 G/DL (ref 33.6–35)
MCV RBC AUTO: 84 FL (ref 81.4–97.8)
PLATELET # BLD AUTO: 168 K/UL (ref 164–446)
PMV BLD AUTO: 12.9 FL (ref 9–12.9)
POTASSIUM SERPL-SCNC: 4.5 MMOL/L (ref 3.6–5.5)
PROT SERPL-MCNC: 5.5 G/DL (ref 6–8.2)
RBC # BLD AUTO: 4.12 M/UL (ref 4.2–5.4)
SODIUM SERPL-SCNC: 137 MMOL/L (ref 135–145)
WBC # BLD AUTO: 14.7 K/UL (ref 4.8–10.8)

## 2022-12-07 PROCEDURE — 82962 GLUCOSE BLOOD TEST: CPT | Mod: 91

## 2022-12-07 PROCEDURE — A9270 NON-COVERED ITEM OR SERVICE: HCPCS | Performed by: OBSTETRICS & GYNECOLOGY

## 2022-12-07 PROCEDURE — 85027 COMPLETE CBC AUTOMATED: CPT

## 2022-12-07 PROCEDURE — 700102 HCHG RX REV CODE 250 W/ 637 OVERRIDE(OP): Performed by: OBSTETRICS & GYNECOLOGY

## 2022-12-07 PROCEDURE — 80053 COMPREHEN METABOLIC PANEL: CPT

## 2022-12-07 PROCEDURE — 36415 COLL VENOUS BLD VENIPUNCTURE: CPT

## 2022-12-07 PROCEDURE — 302790 HCHG STAT ANTEPARTUM CARE, DAILY

## 2022-12-07 PROCEDURE — 770002 HCHG ROOM/CARE - OB PRIVATE (112)

## 2022-12-07 PROCEDURE — 59025 FETAL NON-STRESS TEST: CPT

## 2022-12-07 RX ADMIN — OMEPRAZOLE 20 MG: 20 CAPSULE, DELAYED RELEASE ORAL at 06:01

## 2022-12-07 RX ADMIN — LABETALOL HYDROCHLORIDE 200 MG: 100 TABLET, FILM COATED ORAL at 01:59

## 2022-12-07 RX ADMIN — INSULIN HUMAN 10 UNITS: 100 INJECTION, SUSPENSION SUBCUTANEOUS at 21:56

## 2022-12-07 RX ADMIN — LEVOTHYROXINE SODIUM 224 MCG: 0.11 TABLET ORAL at 05:59

## 2022-12-07 RX ADMIN — SERTRALINE 200 MG: 50 TABLET, FILM COATED ORAL at 06:00

## 2022-12-07 RX ADMIN — LABETALOL HYDROCHLORIDE 200 MG: 100 TABLET, FILM COATED ORAL at 17:23

## 2022-12-07 RX ADMIN — ACETAMINOPHEN 1000 MG: 500 TABLET ORAL at 21:55

## 2022-12-07 RX ADMIN — LABETALOL HYDROCHLORIDE 200 MG: 100 TABLET, FILM COATED ORAL at 08:44

## 2022-12-07 RX ADMIN — FEXOFENADINE HCL 180 MG: 60 TABLET, FILM COATED ORAL at 06:01

## 2022-12-07 ASSESSMENT — PAIN DESCRIPTION - PAIN TYPE: TYPE: ACUTE PAIN

## 2022-12-07 NOTE — PROGRESS NOTES
Pt kept on for extended monitoring for ctx. Once pt did not feel ctx and none were monitored then pt taken off. Bonnie P reviewed tracing and agreed reactive

## 2022-12-07 NOTE — CARE PLAN
Problem: Knowledge Deficit - L&D  Goal: Patient and family/caregivers will demonstrate understanding of plan of care, disease process/condition, diagnostic tests and medications  Outcome: Progressing   The patient is Stable - Low risk of patient condition declining or worsening    Shift Goals  Clinical Goals: Monitor BP  Patient Goals: rest  Family Goals: support    Progress made toward(s) clinical / shift goals:  Monitor BS, BP    Patient is not progressing towards the following goals:

## 2022-12-07 NOTE — PROGRESS NOTES
S: No complaints.  O: Patient Vitals for the past 24 hrs:   BP Temp Temp src Pulse Resp SpO2   12/06/22 1600 (!) 145/70 36.5 °C (97.7 °F) Temporal 69 18 --   12/06/22 1127 (!) 145/82 36.4 °C (97.6 °F) Temporal 64 18 95 %   12/06/22 0751 134/69 36.2 °C (97.1 °F) Temporal 69 18 95 %   12/06/22 0410 (!) 159/78 36.1 °C (97 °F) Temporal 61 17 --   12/06/22 0200 137/61 35.9 °C (96.7 °F) Temporal 69 16 --   12/06/22 0010 133/69 36.2 °C (97.1 °F) Temporal 69 17 --   12/05/22 2010 (!) 147/77 36.3 °C (97.3 °F) Temporal 62 17 --     EFM: Moderate variability and accelerations present. Occasional variable deceleration.   Latest Reference Range & Units 12/06/22 06:21 12/06/22 09:25 12/06/22 13:32 12/06/22 17:37   POC Glucose, Blood 65 - 99 mg/dL 88 96 88 101 (H)   (H): Data is abnormally high    A: IUP 29 3/7 weeks      Preeclampsia, BP unstable.      Gestational diabetes, improved control with diet and NPH at HS.  P: Recheck labs in am       Increase labetalol to 200 mg every 8 hours.       NPH scheduled for 2200, was being given at 1700.     Time:15 minutes

## 2022-12-07 NOTE — PROGRESS NOTES
1900: Report received from Jennifer CLARK RN. POC discussed    1940: Dr Jay at bedside    0700: Report given to Jennifer CLARK RN. POC discussed

## 2022-12-08 LAB
GLUCOSE BLD STRIP.AUTO-MCNC: 102 MG/DL (ref 65–99)
GLUCOSE BLD STRIP.AUTO-MCNC: 105 MG/DL (ref 65–99)
GLUCOSE BLD STRIP.AUTO-MCNC: 114 MG/DL (ref 65–99)
GLUCOSE BLD STRIP.AUTO-MCNC: 77 MG/DL (ref 65–99)

## 2022-12-08 PROCEDURE — 59025 FETAL NON-STRESS TEST: CPT

## 2022-12-08 PROCEDURE — 302790 HCHG STAT ANTEPARTUM CARE, DAILY

## 2022-12-08 PROCEDURE — 700102 HCHG RX REV CODE 250 W/ 637 OVERRIDE(OP): Performed by: OBSTETRICS & GYNECOLOGY

## 2022-12-08 PROCEDURE — 82962 GLUCOSE BLOOD TEST: CPT | Mod: 91

## 2022-12-08 PROCEDURE — A9270 NON-COVERED ITEM OR SERVICE: HCPCS | Performed by: OBSTETRICS & GYNECOLOGY

## 2022-12-08 PROCEDURE — 770002 HCHG ROOM/CARE - OB PRIVATE (112)

## 2022-12-08 RX ADMIN — FEXOFENADINE HCL 180 MG: 60 TABLET, FILM COATED ORAL at 05:56

## 2022-12-08 RX ADMIN — LABETALOL HYDROCHLORIDE 200 MG: 100 TABLET, FILM COATED ORAL at 01:07

## 2022-12-08 RX ADMIN — SERTRALINE 200 MG: 50 TABLET, FILM COATED ORAL at 05:57

## 2022-12-08 RX ADMIN — LABETALOL HYDROCHLORIDE 200 MG: 100 TABLET, FILM COATED ORAL at 17:07

## 2022-12-08 RX ADMIN — LEVOTHYROXINE SODIUM 224 MCG: 0.11 TABLET ORAL at 05:57

## 2022-12-08 RX ADMIN — LABETALOL HYDROCHLORIDE 200 MG: 100 TABLET, FILM COATED ORAL at 09:08

## 2022-12-08 RX ADMIN — ACETAMINOPHEN 1000 MG: 500 TABLET ORAL at 20:04

## 2022-12-08 RX ADMIN — OMEPRAZOLE 20 MG: 20 CAPSULE, DELAYED RELEASE ORAL at 05:57

## 2022-12-08 ASSESSMENT — PAIN DESCRIPTION - PAIN TYPE: TYPE: ACUTE PAIN

## 2022-12-08 NOTE — PROGRESS NOTES
1900: Report received from Jennifer CLARK RN. POC discussed    2015: Pt back to bed. Assessment done. Pt denies VB, LOF, UCS, epigastric pain. States active FM and c/o HA. Externals placed for 1 hr monitoring     0700: Report given to Davina RAPP. POC discussed

## 2022-12-08 NOTE — PROGRESS NOTES
S: No complaints.  O: Patient Vitals for the past 24 hrs:   BP Temp Temp src Pulse Resp   12/07/22 1525 131/69 36.8 °C (98.2 °F) Temporal 73 18   12/07/22 1208 133/82 36.7 °C (98 °F) Temporal 68 16   12/07/22 0815 133/72 36.5 °C (97.7 °F) Temporal 75 18   12/07/22 0400 138/74 36.2 °C (97.1 °F) Temporal 64 17   12/07/22 0000 (!) 146/71 36.3 °C (97.3 °F) Temporal 73 17   12/06/22 2030 137/69 36.2 °C (97.1 °F) Temporal 66 17     EFM: Moderate variability and accelerations present.   Latest Reference Range & Units 12/07/22 06:38   WBC 4.8 - 10.8 K/uL 14.7 (H)   RBC 4.20 - 5.40 M/uL 4.12 (L)   Hemoglobin 12.0 - 16.0 g/dL 11.3 (L)   Hematocrit 37.0 - 47.0 % 34.6 (L)   MCV 81.4 - 97.8 fL 84.0   MCH 27.0 - 33.0 pg 27.4   MCHC 33.6 - 35.0 g/dL 32.7 (L)   RDW 35.9 - 50.0 fL 42.6   Platelet Count 164 - 446 K/uL 168   MPV 9.0 - 12.9 fL 12.9   Sodium 135 - 145 mmol/L 137   Potassium 3.6 - 5.5 mmol/L 4.5   Chloride 96 - 112 mmol/L 105   Co2 20 - 33 mmol/L 23   Anion Gap 7.0 - 16.0  9.0   Glucose 65 - 99 mg/dL 71   Bun 8 - 22 mg/dL 10   Creatinine 0.50 - 1.40 mg/dL 0.69   GFR (CKD-EPI) >60 mL/min/1.73 m 2 118   Calcium 8.5 - 10.5 mg/dL 8.9   AST(SGOT) 12 - 45 U/L 15   ALT(SGPT) 2 - 50 U/L 14   Alkaline Phosphatase 30 - 99 U/L 87   Total Bilirubin 0.1 - 1.5 mg/dL <0.2   Albumin 3.2 - 4.9 g/dL 3.2   Total Protein 6.0 - 8.2 g/dL 5.5 (L)   Globulin 1.9 - 3.5 g/dL 2.3   A-G Ratio g/dL 1.4   (H): Data is abnormally high  (L): Data is abnormally low     Latest Reference Range & Units 12/07/22 06:05 12/07/22 08:35 12/07/22 13:08 12/07/22 17:48   POC Glucose, Blood 65 - 99 mg/dL 66 94 143 (H) 107 (H)   (H): Data is abnormally high    A: IUP 29 4/7 weeks      Preeclampsia, BP unstable.      Gestational diabetes, improved control with diet and NPH at HS.  P: Recheck labs in am       Increase labetalol to 200 mg every 8 hours.       NPH scheduled for 2200, decrease dose to 10 unites.     Time:15 minutes

## 2022-12-08 NOTE — CARE PLAN
Problem: Knowledge Deficit - L&D  Goal: Patient and family/caregivers will demonstrate understanding of plan of care, disease process/condition, diagnostic tests and medications  Outcome: Progressing   The patient is Stable - Low risk of patient condition declining or worsening    Shift Goals  Clinical Goals: Manage BP and BS  Patient Goals: rest  Family Goals: support    Progress made toward(s) clinical / shift goals:  Monitor BP and BS    Patient is not progressing towards the following goals:

## 2022-12-08 NOTE — CARE PLAN
The patient is Stable - Low risk of patient condition declining or worsening    Shift Goals  Clinical Goals: Manage BP and BS  Patient Goals: rest  Family Goals: support    Progress made toward(s) clinical / shift goals:      Patient is not progressing towards the following goals:

## 2022-12-09 LAB
GLUCOSE BLD STRIP.AUTO-MCNC: 120 MG/DL (ref 65–99)
GLUCOSE BLD STRIP.AUTO-MCNC: 140 MG/DL (ref 65–99)
GLUCOSE BLD STRIP.AUTO-MCNC: 73 MG/DL (ref 65–99)
GLUCOSE BLD STRIP.AUTO-MCNC: 99 MG/DL (ref 65–99)

## 2022-12-09 PROCEDURE — 770002 HCHG ROOM/CARE - OB PRIVATE (112)

## 2022-12-09 PROCEDURE — 700102 HCHG RX REV CODE 250 W/ 637 OVERRIDE(OP): Performed by: OBSTETRICS & GYNECOLOGY

## 2022-12-09 PROCEDURE — 82962 GLUCOSE BLOOD TEST: CPT

## 2022-12-09 PROCEDURE — 59025 FETAL NON-STRESS TEST: CPT

## 2022-12-09 PROCEDURE — 302790 HCHG STAT ANTEPARTUM CARE, DAILY

## 2022-12-09 PROCEDURE — A9270 NON-COVERED ITEM OR SERVICE: HCPCS | Performed by: OBSTETRICS & GYNECOLOGY

## 2022-12-09 RX ADMIN — SERTRALINE 200 MG: 50 TABLET, FILM COATED ORAL at 06:14

## 2022-12-09 RX ADMIN — LEVOTHYROXINE SODIUM 336 MCG: 0.11 TABLET ORAL at 06:13

## 2022-12-09 RX ADMIN — INSULIN HUMAN 10 UNITS: 100 INJECTION, SUSPENSION SUBCUTANEOUS at 22:52

## 2022-12-09 RX ADMIN — LABETALOL HYDROCHLORIDE 200 MG: 100 TABLET, FILM COATED ORAL at 16:56

## 2022-12-09 RX ADMIN — LABETALOL HYDROCHLORIDE 200 MG: 100 TABLET, FILM COATED ORAL at 09:12

## 2022-12-09 RX ADMIN — CALCIUM CARBONATE 500 MG: 500 TABLET, CHEWABLE ORAL at 12:07

## 2022-12-09 RX ADMIN — OMEPRAZOLE 20 MG: 20 CAPSULE, DELAYED RELEASE ORAL at 06:14

## 2022-12-09 RX ADMIN — LABETALOL HYDROCHLORIDE 200 MG: 100 TABLET, FILM COATED ORAL at 01:31

## 2022-12-09 RX ADMIN — FEXOFENADINE HCL 180 MG: 60 TABLET, FILM COATED ORAL at 06:13

## 2022-12-09 NOTE — PROGRESS NOTES
0700: Report received from DIONTE Bowden. Assumed care of pt.  0900: Assessment done. Pt denies VB/LOF/Ucs, denies HA, change of vision or RUQ pain. Reports +FM.  [id number]: 1 hr monitoring done.  1900: Report given to DIONTE Hurd.

## 2022-12-09 NOTE — CARE PLAN
Problem: Knowledge Deficit - L&D  Goal: Patient and family/caregivers will demonstrate understanding of plan of care, disease process/condition, diagnostic tests and medications  Outcome: Progressing   The patient is Stable - Low risk of patient condition declining or worsening    Shift Goals  Clinical Goals: [place] BP and BS  Patient Goals: rest  Family Goals: support    Progress made toward(s) clinical / shift goals:  Manage BP and BS    Patient is not progressing towards the following goals:

## 2022-12-09 NOTE — PROGRESS NOTES
0700: Report from Vannessa RAPP. POC discussed.     0900: Assessment done. Patient resting in bed. Whiteboard updated. Call light at bedside.     0928: FSBS 120.    1140: [facility] monitors applied for monitoring.    1220: Dr. Jay called and updated on BP values and FHR tracing. Orders clarified.     1900: Report to Mindy RAPP. POC discussed.

## 2022-12-09 NOTE — PROGRESS NOTES
1900: Report received from Davina BRADFORD RN, POC discussed    1955: Pt requesting medication for HA    2004: Medication given. Assessment done. Pt denies VB, LOF, UCS, epigastric pain. POC discussed. Pt v/u. Externals placed for 1 hr monitoring    0700: Report given to Gabriela DOUGLAS RN, POC discussed

## 2022-12-10 LAB
ALBUMIN SERPL BCP-MCNC: 3.2 G/DL (ref 3.2–4.9)
ALBUMIN/GLOB SERPL: 1.2 G/DL
ALP SERPL-CCNC: 98 U/L (ref 30–99)
ALT SERPL-CCNC: 11 U/L (ref 2–50)
ANION GAP SERPL CALC-SCNC: 11 MMOL/L (ref 7–16)
AST SERPL-CCNC: 10 U/L (ref 12–45)
BASOPHILS # BLD AUTO: 0.2 % (ref 0–1.8)
BASOPHILS # BLD: 0.02 K/UL (ref 0–0.12)
BILIRUB SERPL-MCNC: <0.2 MG/DL (ref 0.1–1.5)
BUN SERPL-MCNC: 11 MG/DL (ref 8–22)
CALCIUM SERPL-MCNC: 9.2 MG/DL (ref 8.5–10.5)
CHLORIDE SERPL-SCNC: 106 MMOL/L (ref 96–112)
CO2 SERPL-SCNC: 22 MMOL/L (ref 20–33)
CREAT SERPL-MCNC: 0.74 MG/DL (ref 0.5–1.4)
EOSINOPHIL # BLD AUTO: 0 K/UL (ref 0–0.51)
EOSINOPHIL NFR BLD: 0 % (ref 0–6.9)
ERYTHROCYTE [DISTWIDTH] IN BLOOD BY AUTOMATED COUNT: 42.7 FL (ref 35.9–50)
ERYTHROCYTE [DISTWIDTH] IN BLOOD BY AUTOMATED COUNT: 43.2 FL (ref 35.9–50)
GFR SERPLBLD CREATININE-BSD FMLA CKD-EPI: 110 ML/MIN/1.73 M 2
GLOBULIN SER CALC-MCNC: 2.7 G/DL (ref 1.9–3.5)
GLUCOSE BLD STRIP.AUTO-MCNC: 110 MG/DL (ref 65–99)
GLUCOSE BLD STRIP.AUTO-MCNC: 114 MG/DL (ref 65–99)
GLUCOSE BLD STRIP.AUTO-MCNC: 80 MG/DL (ref 65–99)
GLUCOSE BLD STRIP.AUTO-MCNC: 82 MG/DL (ref 65–99)
GLUCOSE SERPL-MCNC: 91 MG/DL (ref 65–99)
HCT VFR BLD AUTO: 35.6 % (ref 37–47)
HCT VFR BLD AUTO: 35.9 % (ref 37–47)
HGB BLD-MCNC: 11.4 G/DL (ref 12–16)
HGB BLD-MCNC: 11.5 G/DL (ref 12–16)
IMM GRANULOCYTES # BLD AUTO: 0.05 K/UL (ref 0–0.11)
IMM GRANULOCYTES NFR BLD AUTO: 0.4 % (ref 0–0.9)
LYMPHOCYTES # BLD AUTO: 2.4 K/UL (ref 1–4.8)
LYMPHOCYTES NFR BLD: 18.1 % (ref 22–41)
MCH RBC QN AUTO: 26.8 PG (ref 27–33)
MCH RBC QN AUTO: 26.9 PG (ref 27–33)
MCHC RBC AUTO-ENTMCNC: 32 G/DL (ref 33.6–35)
MCHC RBC AUTO-ENTMCNC: 32 G/DL (ref 33.6–35)
MCV RBC AUTO: 83.7 FL (ref 81.4–97.8)
MCV RBC AUTO: 84 FL (ref 81.4–97.8)
MONOCYTES # BLD AUTO: 0.88 K/UL (ref 0–0.85)
MONOCYTES NFR BLD AUTO: 6.6 % (ref 0–13.4)
NEUTROPHILS # BLD AUTO: 9.93 K/UL (ref 2–7.15)
NEUTROPHILS NFR BLD: 74.7 % (ref 44–72)
NRBC # BLD AUTO: 0 K/UL
NRBC BLD-RTO: 0 /100 WBC
PLATELET # BLD AUTO: 157 K/UL (ref 164–446)
PLATELET # BLD AUTO: 167 K/UL (ref 164–446)
PMV BLD AUTO: 13.3 FL (ref 9–12.9)
PMV BLD AUTO: 13.6 FL (ref 9–12.9)
POTASSIUM SERPL-SCNC: 4.3 MMOL/L (ref 3.6–5.5)
PROT SERPL-MCNC: 5.9 G/DL (ref 6–8.2)
RBC # BLD AUTO: 4.24 M/UL (ref 4.2–5.4)
RBC # BLD AUTO: 4.29 M/UL (ref 4.2–5.4)
SODIUM SERPL-SCNC: 139 MMOL/L (ref 135–145)
WBC # BLD AUTO: 13.3 K/UL (ref 4.8–10.8)
WBC # BLD AUTO: 13.7 K/UL (ref 4.8–10.8)

## 2022-12-10 PROCEDURE — 770002 HCHG ROOM/CARE - OB PRIVATE (112)

## 2022-12-10 PROCEDURE — 36415 COLL VENOUS BLD VENIPUNCTURE: CPT

## 2022-12-10 PROCEDURE — 85027 COMPLETE CBC AUTOMATED: CPT

## 2022-12-10 PROCEDURE — 82962 GLUCOSE BLOOD TEST: CPT | Mod: 91

## 2022-12-10 PROCEDURE — 59025 FETAL NON-STRESS TEST: CPT

## 2022-12-10 PROCEDURE — A9270 NON-COVERED ITEM OR SERVICE: HCPCS | Performed by: OBSTETRICS & GYNECOLOGY

## 2022-12-10 PROCEDURE — 700102 HCHG RX REV CODE 250 W/ 637 OVERRIDE(OP): Performed by: OBSTETRICS & GYNECOLOGY

## 2022-12-10 PROCEDURE — 80053 COMPREHEN METABOLIC PANEL: CPT

## 2022-12-10 PROCEDURE — 85025 COMPLETE CBC W/AUTO DIFF WBC: CPT

## 2022-12-10 PROCEDURE — 302790 HCHG STAT ANTEPARTUM CARE, DAILY

## 2022-12-10 RX ADMIN — LABETALOL HYDROCHLORIDE 200 MG: 100 TABLET, FILM COATED ORAL at 17:18

## 2022-12-10 RX ADMIN — SERTRALINE 200 MG: 50 TABLET, FILM COATED ORAL at 06:53

## 2022-12-10 RX ADMIN — INSULIN HUMAN 10 UNITS: 100 INJECTION, SUSPENSION SUBCUTANEOUS at 21:57

## 2022-12-10 RX ADMIN — LABETALOL HYDROCHLORIDE 200 MG: 100 TABLET, FILM COATED ORAL at 09:25

## 2022-12-10 RX ADMIN — OMEPRAZOLE 20 MG: 20 CAPSULE, DELAYED RELEASE ORAL at 06:00

## 2022-12-10 RX ADMIN — LABETALOL HYDROCHLORIDE 200 MG: 100 TABLET, FILM COATED ORAL at 00:48

## 2022-12-10 RX ADMIN — FEXOFENADINE HCL 180 MG: 60 TABLET, FILM COATED ORAL at 09:41

## 2022-12-10 RX ADMIN — LEVOTHYROXINE SODIUM 224 MCG: 0.11 TABLET ORAL at 06:49

## 2022-12-10 NOTE — PROGRESS NOTES
S: No complaints.  O: Patient Vitals for the past 24 hrs:   BP Temp Temp src Pulse Resp SpO2   12/09/22 1757 135/70 36 °C (96.8 °F) Temporal 74 18 97 %   12/09/22 1148 (!) 149/84 36.2 °C (97.1 °F) Temporal 75 17 97 %   12/09/22 0912 (!) 148/74 -- -- -- -- --   12/09/22 0756 (!) 141/79 35.9 °C (96.7 °F) Temporal 66 13 94 %   12/09/22 0400 130/74 35.8 °C (96.5 °F) Temporal 68 17 --   12/09/22 0000 119/58 35.9 °C (96.6 °F) Temporal 73 16 --   12/08/22 2000 128/60 36 °C (96.8 °F) Temporal 77 16 --     EFM: Moderate variability and accelerations present.   Latest Reference Range & Units 12/09/22 06:11 12/09/22 09:28 12/09/22 14:32 12/09/22 18:25   POC Glucose, Blood 65 - 99 mg/dL 99 120 (H) 140 (H) 73   (H): Data is abnormally high    A: IUP 29 6/7 weeks       Preeclampsia, labile BP on labetalol 200 mg every 8 hours.      Gestational diabetes on 10 units NPH at HS.  P: Repeat labs in am.    Time: 15 minutes.

## 2022-12-10 NOTE — CARE PLAN
The patient is Stable - Low risk of patient condition declining or worsening    Shift Goals  Clinical Goals: maintain bp wnl  Patient Goals: stay pregnant  Family Goals: support    Progress made toward(s) clinical / shift goals:    Problem: Knowledge Deficit - L&D  Goal: Patient and family/caregivers will demonstrate understanding of plan of care, disease process/condition, diagnostic tests and medications  Outcome: Progressing     Problem: Psychosocial  Goal: Patient's ability to identify and develop effective coping behaviors will improve  Outcome: Progressing

## 2022-12-10 NOTE — CARE PLAN
The patient is Watcher - Medium risk of patient condition declining or worsening    Shift Goals  Clinical Goals: Manage BP and BS  Patient Goals: rest, stay pregnant  Family Goals: support      Problem: Knowledge Deficit - L&D  Goal: Patient and family/caregivers will demonstrate understanding of plan of care, disease process/condition, diagnostic tests and medications  12/9/2022 2019 by Gabriela Curiel R.N.  Outcome: Progressing  12/9/2022 2019 by Gabriela Curiel R.N.  Outcome: Progressing     Problem: Risk for Excess Fluid Volume  Goal: Patient will demonstrate pulse, blood pressure and neurologic signs within expected ranges and without any respiratory complications  12/9/2022 2019 by Gabriela Curiel R.N.  Outcome: Progressing  12/9/2022 2019 by Gabriela Curiel R.N.  Outcome: Progressing     Problem: Provide Safe Environment  Goal: Suicide environmental safety, protocols, policies, and practices will be implemented  12/9/2022 2019 by Gabriela Curiel R.N.  Outcome: Progressing  12/9/2022 2019 by Gabriela Curiel R.N.  Outcome: Progressing     Problem: Psychosocial  Goal: Patient's ability to identify and develop effective coping behaviors will improve  12/9/2022 2019 by Gabriela Curiel R.N.  Outcome: Progressing  12/9/2022 2019 by Gabriela Curiel R.N.  Outcome: Progressing  Goal: Patient's ability to identify and utilize available support systems will improve  12/9/2022 2019 by Gabriela Curiel R.N.  Outcome: Progressing  12/9/2022 2019 by Gabriela Curiel R.N.  Outcome: Progressing     Problem: Skin Integrity  Goal: Skin integrity is maintained or improved  12/9/2022 2019 by Gabriela Curiel R.N.  Outcome: Progressing  12/9/2022 2019 by Gabriela Curiel R.N.  Outcome: Progressing     Problem: Psychosocial - L&D  Goal: Patient's level of anxiety will decrease  12/9/2022 2019 by Gabriela Curiel R.N.  Outcome: Progressing  12/9/2022 2019 by Gabriela MOORE  GIUSEPPE Curiel  Outcome: Progressing  Goal: Patient will be able to discuss coping skills during hospitalization  12/9/2022 2019 by Gabriela Curiel R.N.  Outcome: Progressing  12/9/2022 2019 by Gabriela Curiel R.N.  Outcome: Progressing  Goal: Patient's ability to re-evaluate and adapt role responsibilities will improve  12/9/2022 2019 by Gabriela Curiel R.N.  Outcome: Progressing  12/9/2022 2019 by Gabriela Curiel R.N.  Outcome: Progressing  Goal: Spiritual and cultural needs incorporated into hospitalization  12/9/2022 2019 by Gabriela Curiel R.N.  Outcome: Progressing  12/9/2022 2019 by Gabriela Curiel R.N.  Outcome: Progressing     Problem: Cardiac Output  Goal: Patient will remain normotensive throughout hospitalization  12/9/2022 2019 by Gabriela Curiel R.N.  Outcome: Progressing  12/9/2022 2019 by Gabriela Curiel R.N.  Outcome: Progressing     Problem: Pain  Goal: Patient's pain will be alleviated or reduced to the patient’s comfort goal  12/9/2022 2019 by Gabriela Curiel R.N.  Outcome: Progressing  12/9/2022 2019 by Gabriela Curiel R.N.  Outcome: Progressing     Problem: Risk for Fluid Imbalance  Goal: Patient's fluid volume balance will be maintained or improve  12/9/2022 2019 by Gabriela Curiel R.N.  Outcome: Progressing  12/9/2022 2019 by Gabriela Curiel R.N.  Outcome: Progressing     Problem: Risk for Infection and Impaired Wound Healing  Goal: Patient will remain free from infection  12/9/2022 2019 by Gabriela Curiel R.N.  Outcome: Progressing  12/9/2022 2019 by Gabriela Curiel R.N.  Outcome: Progressing  Goal: Patient's wound/surgical incision will decrease in size and heals properly  12/9/2022 2019 by Gabriela Curiel R.N.  Outcome: Progressing  12/9/2022 2019 by Gabriela Curiel R.N.  Outcome: Progressing     Problem: Risk for Injury  Goal: Patient and fetus will be free of preventable injury/complications  12/9/2022 2019  by Gabriela Curiel R.N.  Outcome: Progressing  Note: EFM per order.  12/9/2022 2019 by Gabriela Curiel R.N.  Outcome: Progressing     Problem: Risk for Venous Thromboembolism (VTE)  Goal: VTE prevention measures will be implemented and patient will remain free from VTE  12/9/2022 2019 by Gabriela Curiel R.N.  Outcome: Progressing  12/9/2022 2019 by Gabriela Curiel R.N.  Outcome: Progressing     Problem: Discharge Barriers/Planning  Goal: Patient's continuum of care needs are met  12/9/2022 2019 by Gabriela Curiel R.N.  Outcome: Progressing  12/9/2022 2019 by Gabriela Curiel R.N.  Outcome: Progressing

## 2022-12-11 LAB
GLUCOSE BLD STRIP.AUTO-MCNC: 104 MG/DL (ref 65–99)
GLUCOSE BLD STRIP.AUTO-MCNC: 107 MG/DL (ref 65–99)
GLUCOSE BLD STRIP.AUTO-MCNC: 67 MG/DL (ref 65–99)
GLUCOSE BLD STRIP.AUTO-MCNC: 88 MG/DL (ref 65–99)
GLUCOSE BLD STRIP.AUTO-MCNC: 97 MG/DL (ref 65–99)

## 2022-12-11 PROCEDURE — 82962 GLUCOSE BLOOD TEST: CPT

## 2022-12-11 PROCEDURE — 700102 HCHG RX REV CODE 250 W/ 637 OVERRIDE(OP): Performed by: OBSTETRICS & GYNECOLOGY

## 2022-12-11 PROCEDURE — 770002 HCHG ROOM/CARE - OB PRIVATE (112)

## 2022-12-11 PROCEDURE — A9270 NON-COVERED ITEM OR SERVICE: HCPCS | Performed by: OBSTETRICS & GYNECOLOGY

## 2022-12-11 PROCEDURE — 302790 HCHG STAT ANTEPARTUM CARE, DAILY

## 2022-12-11 PROCEDURE — 59025 FETAL NON-STRESS TEST: CPT

## 2022-12-11 RX ADMIN — LEVOTHYROXINE SODIUM 224 MCG: 0.11 TABLET ORAL at 06:45

## 2022-12-11 RX ADMIN — SERTRALINE 200 MG: 50 TABLET, FILM COATED ORAL at 07:19

## 2022-12-11 RX ADMIN — LABETALOL HYDROCHLORIDE 200 MG: 100 TABLET, FILM COATED ORAL at 08:52

## 2022-12-11 RX ADMIN — FEXOFENADINE HCL 180 MG: 60 TABLET, FILM COATED ORAL at 07:20

## 2022-12-11 RX ADMIN — LABETALOL HYDROCHLORIDE 200 MG: 100 TABLET, FILM COATED ORAL at 00:05

## 2022-12-11 RX ADMIN — OMEPRAZOLE 20 MG: 20 CAPSULE, DELAYED RELEASE ORAL at 07:20

## 2022-12-11 RX ADMIN — LABETALOL HYDROCHLORIDE 200 MG: 100 TABLET, FILM COATED ORAL at 16:51

## 2022-12-11 ASSESSMENT — PATIENT HEALTH QUESTIONNAIRE - PHQ9
SUM OF ALL RESPONSES TO PHQ9 QUESTIONS 1 AND 2: 0
1. LITTLE INTEREST OR PLEASURE IN DOING THINGS: NOT AT ALL
2. FEELING DOWN, DEPRESSED, IRRITABLE, OR HOPELESS: NOT AT ALL

## 2022-12-11 NOTE — PROGRESS NOTES
S: Denies headache or visual changes.  O: Patient Vitals for the past 24 hrs:   BP Temp Temp src Pulse Resp   12/10/22 1630 138/79 36.1 °C (97 °F) Temporal 81 17   12/10/22 1149 (!) 140/81 36.1 °C (97 °F) Temporal 75 17   12/10/22 0750 (!) 144/74 35.9 °C (96.7 °F) Temporal 82 17   12/10/22 0420 (!) 143/78 37.1 °C (98.8 °F) Temporal 71 17   12/10/22 0000 138/70 36.4 °C (97.5 °F) Temporal 75 18   12/09/22 2010 126/63 36.1 °C (97 °F) Temporal 71 17     EFM: Moderate variability and accelerations present.   Latest Reference Range & Units 12/10/22 06:52 12/10/22 09:27 12/10/22 13:20   POC Glucose, Blood 65 - 99 mg/dL 80 114 (H) 82   (H): Data is abnormally high  A: IUP 30 weeks       Preeclampsia, labile       Gestational diabetes on NPH at HS.  P: Continue with present management.    Time: 15 minutes.

## 2022-12-11 NOTE — CARE PLAN
The patient is Stable - Low risk of patient condition declining or worsening    Shift Goals  Clinical Goals: maintain bp wnl  Patient Goals: stay pregnant  Family Goals: support    Progress made toward(s) clinical / shift goals:      Patient is not progressing towards the following goals:

## 2022-12-11 NOTE — PROGRESS NOTES
1900- report received from Yoana RAPP   2100- Savi CAMPBELL informd of IV status, ordered to d/c iv and not replace at this time  0700- REPORT GIVEN TO LOKI RAPP

## 2022-12-11 NOTE — CARE PLAN
The patient is Stable - Low risk of patient condition declining or worsening    Shift Goals  Clinical Goals: maintain bp wnl  Patient Goals: stay pregnant  Family Goals: support    Progress made toward(s) clinical / shift goals:    Problem: Knowledge Deficit - L&D  Goal: Patient and family/caregivers will demonstrate understanding of plan of care, disease process/condition, diagnostic tests and medications  Outcome: Progressing     Problem: Provide Safe Environment  Goal: Suicide environmental safety, protocols, policies, and practices will be implemented  Outcome: Progressing

## 2022-12-11 NOTE — PROGRESS NOTES
S: No complaints.  O: Patient Vitals for the past 24 hrs:   BP Temp Temp src Pulse Resp   12/11/22 1225 120/68 36.2 °C (97.2 °F) Temporal 71 17   12/11/22 0832 117/63 36.2 °C (97.2 °F) Temporal 74 17   12/11/22 0400 114/57 36 °C (96.8 °F) Temporal 68 18   12/11/22 0000 136/77 36.2 °C (97.1 °F) Temporal 72 18   12/10/22 2019 137/75 36.1 °C (96.9 °F) Temporal 70 18   12/10/22 2000 137/75 -- -- 70 --   12/10/22 1630 138/79 36.1 °C (97 °F) Temporal 81 17      Latest Reference Range & Units 12/11/22 06:48 12/11/22 08:50 12/11/22 13:00 12/11/22 13:11   POC Glucose, Blood 65 - 99 mg/dL 67 107 (H) 88 97   (H): Data is abnormally high    EFM: moderate variability and accelerations present.    A: IUP 30 1/7 weeks       Preclampsia       Gestational diabetes now with low glucose.  P:  Hold insulin at HS.         Repeat labs and 24 hour urine for total protein.    Time: 15 minutes.

## 2022-12-12 LAB
ALBUMIN SERPL BCP-MCNC: 3.4 G/DL (ref 3.2–4.9)
ALBUMIN/GLOB SERPL: 1.5 G/DL
ALP SERPL-CCNC: 101 U/L (ref 30–99)
ALT SERPL-CCNC: 10 U/L (ref 2–50)
ANION GAP SERPL CALC-SCNC: 12 MMOL/L (ref 7–16)
AST SERPL-CCNC: 11 U/L (ref 12–45)
BILIRUB SERPL-MCNC: 0.2 MG/DL (ref 0.1–1.5)
BUN SERPL-MCNC: 12 MG/DL (ref 8–22)
CALCIUM SERPL-MCNC: 9.2 MG/DL (ref 8.5–10.5)
CHLORIDE SERPL-SCNC: 105 MMOL/L (ref 96–112)
CO2 SERPL-SCNC: 21 MMOL/L (ref 20–33)
CREAT SERPL-MCNC: 0.73 MG/DL (ref 0.5–1.4)
ERYTHROCYTE [DISTWIDTH] IN BLOOD BY AUTOMATED COUNT: 43.2 FL (ref 35.9–50)
GFR SERPLBLD CREATININE-BSD FMLA CKD-EPI: 112 ML/MIN/1.73 M 2
GLOBULIN SER CALC-MCNC: 2.3 G/DL (ref 1.9–3.5)
GLUCOSE BLD STRIP.AUTO-MCNC: 107 MG/DL (ref 65–99)
GLUCOSE BLD STRIP.AUTO-MCNC: 135 MG/DL (ref 65–99)
GLUCOSE BLD STRIP.AUTO-MCNC: 86 MG/DL (ref 65–99)
GLUCOSE BLD STRIP.AUTO-MCNC: 95 MG/DL (ref 65–99)
GLUCOSE SERPL-MCNC: 87 MG/DL (ref 65–99)
HCT VFR BLD AUTO: 37.3 % (ref 37–47)
HGB BLD-MCNC: 11.8 G/DL (ref 12–16)
MCH RBC QN AUTO: 26.6 PG (ref 27–33)
MCHC RBC AUTO-ENTMCNC: 31.6 G/DL (ref 33.6–35)
MCV RBC AUTO: 84.2 FL (ref 81.4–97.8)
PLATELET # BLD AUTO: 188 K/UL (ref 164–446)
PMV BLD AUTO: 13.7 FL (ref 9–12.9)
POTASSIUM SERPL-SCNC: 4.5 MMOL/L (ref 3.6–5.5)
PROT 24H UR-MCNC: 175 MG/24 HR (ref 30–150)
PROT 24H UR-MRATE: 7 MG/DL (ref 0–15)
PROT SERPL-MCNC: 5.7 G/DL (ref 6–8.2)
RBC # BLD AUTO: 4.43 M/UL (ref 4.2–5.4)
SODIUM SERPL-SCNC: 138 MMOL/L (ref 135–145)
SPECIMEN VOL UR: 2500 ML
WBC # BLD AUTO: 14.1 K/UL (ref 4.8–10.8)

## 2022-12-12 PROCEDURE — 80053 COMPREHEN METABOLIC PANEL: CPT

## 2022-12-12 PROCEDURE — 302790 HCHG STAT ANTEPARTUM CARE, DAILY

## 2022-12-12 PROCEDURE — 770002 HCHG ROOM/CARE - OB PRIVATE (112)

## 2022-12-12 PROCEDURE — 36415 COLL VENOUS BLD VENIPUNCTURE: CPT

## 2022-12-12 PROCEDURE — 85027 COMPLETE CBC AUTOMATED: CPT

## 2022-12-12 PROCEDURE — 59025 FETAL NON-STRESS TEST: CPT

## 2022-12-12 PROCEDURE — 82962 GLUCOSE BLOOD TEST: CPT | Mod: 91

## 2022-12-12 PROCEDURE — A9270 NON-COVERED ITEM OR SERVICE: HCPCS | Performed by: OBSTETRICS & GYNECOLOGY

## 2022-12-12 PROCEDURE — 81050 URINALYSIS VOLUME MEASURE: CPT

## 2022-12-12 PROCEDURE — 84156 ASSAY OF PROTEIN URINE: CPT

## 2022-12-12 PROCEDURE — 700102 HCHG RX REV CODE 250 W/ 637 OVERRIDE(OP): Performed by: OBSTETRICS & GYNECOLOGY

## 2022-12-12 RX ADMIN — CALCIUM CARBONATE 500 MG: 500 TABLET, CHEWABLE ORAL at 10:31

## 2022-12-12 RX ADMIN — LABETALOL HYDROCHLORIDE 200 MG: 100 TABLET, FILM COATED ORAL at 17:05

## 2022-12-12 RX ADMIN — LABETALOL HYDROCHLORIDE 200 MG: 100 TABLET, FILM COATED ORAL at 08:47

## 2022-12-12 RX ADMIN — SERTRALINE 200 MG: 50 TABLET, FILM COATED ORAL at 09:15

## 2022-12-12 RX ADMIN — ACETAMINOPHEN 1000 MG: 500 TABLET ORAL at 17:05

## 2022-12-12 RX ADMIN — FEXOFENADINE HCL 180 MG: 60 TABLET, FILM COATED ORAL at 08:08

## 2022-12-12 RX ADMIN — LABETALOL HYDROCHLORIDE 200 MG: 100 TABLET, FILM COATED ORAL at 00:53

## 2022-12-12 RX ADMIN — LEVOTHYROXINE SODIUM 224 MCG: 0.11 TABLET ORAL at 08:09

## 2022-12-12 RX ADMIN — OMEPRAZOLE 20 MG: 20 CAPSULE, DELAYED RELEASE ORAL at 08:08

## 2022-12-12 NOTE — CARE PLAN
The patient is Stable - Low risk of patient condition declining or worsening    Problem: Knowledge Deficit - L&D  Goal: Patient and family/caregivers will demonstrate understanding of plan of care, disease process/condition, diagnostic tests and medications  Outcome: Progressing     Problem: Risk for Excess Fluid Volume  Goal: Patient will demonstrate pulse, blood pressure and neurologic signs within expected ranges and without any respiratory complications  Outcome: Progressing     Problem: Provide Safe Environment  Goal: Suicide environmental safety, protocols, policies, and practices will be implemented  Outcome: Progressing     Problem: Psychosocial  Goal: Patient's ability to identify and develop effective coping behaviors will improve  Outcome: Progressing  Goal: Patient's ability to identify and utilize available support systems will improve  Outcome: Progressing     Problem: Skin Integrity  Goal: Skin integrity is maintained or improved  Outcome: Progressing     Problem: Psychosocial - L&D  Goal: Patient's level of anxiety will decrease  Outcome: Progressing  Goal: Patient will be able to discuss coping skills during hospitalization  Outcome: Progressing  Goal: Patient's ability to re-evaluate and adapt role responsibilities will improve  Outcome: Progressing  Goal: Spiritual and cultural needs incorporated into hospitalization  Outcome: Progressing     Problem: Cardiac Output  Goal: Patient will remain normotensive throughout hospitalization  Outcome: Progressing     Problem: Pain  Goal: Patient's pain will be alleviated or reduced to the patient’s comfort goal  Outcome: Progressing     Problem: Risk for Fluid Imbalance  Goal: Patient's fluid volume balance will be maintained or improve  Outcome: Progressing     Problem: Risk for Infection and Impaired Wound Healing  Goal: Patient will remain free from infection  Outcome: Progressing  Goal: Patient's wound/surgical incision will decrease in size and heals  properly  Outcome: Progressing     Problem: Risk for Injury  Goal: Patient and fetus will be free of preventable injury/complications  Outcome: Progressing     Problem: Risk for Venous Thromboembolism (VTE)  Goal: VTE prevention measures will be implemented and patient will remain free from VTE  Outcome: Progressing     Problem: Discharge Barriers/Planning  Goal: Patient's continuum of care needs are met  Outcome: Progressing

## 2022-12-12 NOTE — PROGRESS NOTES
0700: Report from Vannesa RAPP. POC discussed.     0810: Assessment completed, VSS. Pt resting in bed. Pt denies contractions/LOF/VB/ states normal FM. Pt denies HA/visual changes/epigastric pain. Whiteboard updated. Call light at bedside.     1900: Report to Madelaine RAPP. POC discussed.

## 2022-12-12 NOTE — PROGRESS NOTES
1900: Received report from DIONTE Rodriguez.     2215: Called Dr. Jay to review strip-Variables and no accels. MD verbalizes understanding. Orders to get a better tracing and update.    2255: Called Dr. Jay to report strip looking better- moderate with variables and 10x10 accels. States she can be removed from monitors.

## 2022-12-13 ENCOUNTER — PHARMACY VISIT (OUTPATIENT)
Dept: PHARMACY | Facility: MEDICAL CENTER | Age: 32
End: 2022-12-13
Payer: COMMERCIAL

## 2022-12-13 VITALS
RESPIRATION RATE: 16 BRPM | OXYGEN SATURATION: 94 % | DIASTOLIC BLOOD PRESSURE: 58 MMHG | TEMPERATURE: 97.1 F | HEIGHT: 65 IN | SYSTOLIC BLOOD PRESSURE: 122 MMHG | HEART RATE: 74 BPM | WEIGHT: 271 LBS | BODY MASS INDEX: 45.15 KG/M2

## 2022-12-13 LAB
GLUCOSE BLD STRIP.AUTO-MCNC: 107 MG/DL (ref 65–99)
GLUCOSE BLD STRIP.AUTO-MCNC: 79 MG/DL (ref 65–99)
GLUCOSE BLD STRIP.AUTO-MCNC: 79 MG/DL (ref 65–99)

## 2022-12-13 PROCEDURE — 82962 GLUCOSE BLOOD TEST: CPT

## 2022-12-13 PROCEDURE — A9270 NON-COVERED ITEM OR SERVICE: HCPCS | Performed by: OBSTETRICS & GYNECOLOGY

## 2022-12-13 PROCEDURE — RXMED WILLOW AMBULATORY MEDICATION CHARGE: Performed by: OBSTETRICS & GYNECOLOGY

## 2022-12-13 PROCEDURE — 59025 FETAL NON-STRESS TEST: CPT

## 2022-12-13 PROCEDURE — 700102 HCHG RX REV CODE 250 W/ 637 OVERRIDE(OP): Performed by: OBSTETRICS & GYNECOLOGY

## 2022-12-13 RX ORDER — LABETALOL 200 MG/1
200 TABLET, FILM COATED ORAL EVERY 8 HOURS
Qty: 60 TABLET | Refills: 0 | Status: ON HOLD | OUTPATIENT
Start: 2022-12-13 | End: 2022-12-29

## 2022-12-13 RX ADMIN — SERTRALINE 200 MG: 50 TABLET, FILM COATED ORAL at 08:15

## 2022-12-13 RX ADMIN — LABETALOL HYDROCHLORIDE 200 MG: 100 TABLET, FILM COATED ORAL at 17:31

## 2022-12-13 RX ADMIN — LABETALOL HYDROCHLORIDE 200 MG: 100 TABLET, FILM COATED ORAL at 01:58

## 2022-12-13 RX ADMIN — LEVOTHYROXINE SODIUM 336 MCG: 0.11 TABLET ORAL at 06:15

## 2022-12-13 RX ADMIN — FEXOFENADINE HCL 180 MG: 60 TABLET, FILM COATED ORAL at 08:15

## 2022-12-13 RX ADMIN — OMEPRAZOLE 20 MG: 20 CAPSULE, DELAYED RELEASE ORAL at 08:15

## 2022-12-13 RX ADMIN — LABETALOL HYDROCHLORIDE 200 MG: 100 TABLET, FILM COATED ORAL at 08:15

## 2022-12-13 ASSESSMENT — PATIENT HEALTH QUESTIONNAIRE - PHQ9
2. FEELING DOWN, DEPRESSED, IRRITABLE, OR HOPELESS: NOT AT ALL
SUM OF ALL RESPONSES TO PHQ QUESTIONS 1-9: 0
7. TROUBLE CONCENTRATING ON THINGS, SUCH AS READING THE NEWSPAPER OR WATCHING TELEVISION: NOT AT ALL
6. FEELING BAD ABOUT YOURSELF - OR THAT YOU ARE A FAILURE OR HAVE LET YOURSELF OR YOUR FAMILY DOWN: NOT AL ALL
SUM OF ALL RESPONSES TO PHQ9 QUESTIONS 1 AND 2: 0
4. FEELING TIRED OR HAVING LITTLE ENERGY: NOT AT ALL
5. POOR APPETITE OR OVEREATING: NOT AT ALL
1. LITTLE INTEREST OR PLEASURE IN DOING THINGS: NOT AT ALL
8. MOVING OR SPEAKING SO SLOWLY THAT OTHER PEOPLE COULD HAVE NOTICED. OR THE OPPOSITE, BEING SO FIGETY OR RESTLESS THAT YOU HAVE BEEN MOVING AROUND A LOT MORE THAN USUAL: NOT AT ALL
3. TROUBLE FALLING OR STAYING ASLEEP OR SLEEPING TOO MUCH: NOT AT ALL
9. THOUGHTS THAT YOU WOULD BE BETTER OFF DEAD, OR OF HURTING YOURSELF: NOT AT ALL

## 2022-12-13 NOTE — PROGRESS NOTES
0700 Report received from Madelaine RN    0928 NST complete and reactive    1245 Dr. Jay updated on BP and BG per MD plan to discharge tonight    1840 Dr. Jay to bedside; discharge instructions complete, patient hs no further questions at this time

## 2022-12-13 NOTE — PROGRESS NOTES
S: No complaints.  O: Patient Vitals for the past 24 hrs:   BP Temp Temp src Pulse Resp   12/12/22 1605 (!) 144/82 36.1 °C (97 °F) Temporal 73 17   12/12/22 1208 118/62 36.4 °C (97.5 °F) Temporal 80 17   12/12/22 0850 124/60 -- -- 77 --   12/12/22 0817 (!) 141/84 36.1 °C (97 °F) Temporal 84 16   12/12/22 0400 117/57 36.7 °C (98.1 °F) Temporal 72 17   12/12/22 0100 116/58 36.1 °C (97 °F) Temporal 71 17   12/11/22 2020 122/64 36.2 °C (97.2 °F) Temporal 72 17     EFM: Moderate variability and accelerations present.   Latest Reference Range & Units 12/12/22 05:45   WBC 4.8 - 10.8 K/uL 14.1 (H)   RBC 4.20 - 5.40 M/uL 4.43   Hemoglobin 12.0 - 16.0 g/dL 11.8 (L)   Hematocrit 37.0 - 47.0 % 37.3   MCV 81.4 - 97.8 fL 84.2   MCH 27.0 - 33.0 pg 26.6 (L)   MCHC 33.6 - 35.0 g/dL 31.6 (L)   RDW 35.9 - 50.0 fL 43.2   Platelet Count 164 - 446 K/uL 188   (H): Data is abnormally high  (L): Data is abnormally low     Latest Reference Range & Units 12/12/22 05:45   Sodium 135 - 145 mmol/L 138   Potassium 3.6 - 5.5 mmol/L 4.5   Chloride 96 - 112 mmol/L 105   Co2 20 - 33 mmol/L 21   Anion Gap 7.0 - 16.0  12.0   Glucose 65 - 99 mg/dL 87   Bun 8 - 22 mg/dL 12   Creatinine 0.50 - 1.40 mg/dL 0.73   GFR (CKD-EPI) >60 mL/min/1.73 m 2 112   Calcium 8.5 - 10.5 mg/dL 9.2   AST(SGOT) 12 - 45 U/L 11 (L)   ALT(SGPT) 2 - 50 U/L 10   Alkaline Phosphatase 30 - 99 U/L 101 (H)   Total Bilirubin 0.1 - 1.5 mg/dL 0.2   Albumin 3.2 - 4.9 g/dL 3.4   Total Protein 6.0 - 8.2 g/dL 5.7 (L)   Globulin 1.9 - 3.5 g/dL 2.3   A-G Ratio g/dL 1.5   (L): Data is abnormally low  (H): Data is abnormally high   Latest Reference Range & Units 12/12/22 16:45   Total Protein, 24 Hour Urine 30.0 - 150.0 mg/24 Hr 175.0 (H)   (H): Data is abnormally high  A: IUP 30 2/7 weeks       Preclampsia       Gestational diabetes now with low glucose.  P:  Hold insulin at HS.         If glucose remains normal will consider outpatient care.     Time: 15 minutes.

## 2022-12-13 NOTE — CARE PLAN
The patient is Watcher - Medium risk of patient condition declining or worsening    Shift Goals  Clinical Goals: maintain bp wnl  Patient Goals: stay pregnant  Family Goals: support      Problem: Knowledge Deficit - L&D  Goal: Patient and family/caregivers will demonstrate understanding of plan of care, disease process/condition, diagnostic tests and medications  Outcome: Progressing     Problem: Risk for Excess Fluid Volume  Goal: Patient will demonstrate pulse, blood pressure and neurologic signs within expected ranges and without any respiratory complications  Outcome: Progressing     Problem: Provide Safe Environment  Goal: Suicide environmental safety, protocols, policies, and practices will be implemented  Outcome: Progressing     Problem: Psychosocial  Goal: Patient's ability to identify and develop effective coping behaviors will improve  Outcome: Progressing  Goal: Patient's ability to identify and utilize available support systems will improve  Outcome: Progressing     Problem: Skin Integrity  Goal: Skin integrity is maintained or improved  Outcome: Progressing     Problem: Psychosocial - L&D  Goal: Patient's level of anxiety will decrease  Outcome: Progressing  Goal: Patient will be able to discuss coping skills during hospitalization  Outcome: Progressing  Goal: Patient's ability to re-evaluate and adapt role responsibilities will improve  Outcome: Progressing  Goal: Spiritual and cultural needs incorporated into hospitalization  Outcome: Progressing     Problem: Cardiac Output  Goal: Patient will remain normotensive throughout hospitalization  Outcome: Progressing     Problem: Pain  Goal: Patient's pain will be alleviated or reduced to the patient’s comfort goal  Outcome: Progressing  Note: Pain assessed, pharmacologic pain management administered per order.     Problem: Risk for Fluid Imbalance  Goal: Patient's fluid volume balance will be maintained or improve  Outcome: Progressing     Problem: Risk  for Infection and Impaired Wound Healing  Goal: Patient will remain free from infection  Outcome: Progressing  Goal: Patient's wound/surgical incision will decrease in size and heals properly  Outcome: Progressing     Problem: Risk for Injury  Goal: Patient and fetus will be free of preventable injury/complications  Outcome: Progressing  Note: EFM done per order.     Problem: Risk for Venous Thromboembolism (VTE)  Goal: VTE prevention measures will be implemented and patient will remain free from VTE  Outcome: Progressing     Problem: Discharge Barriers/Planning  Goal: Patient's continuum of care needs are met  Outcome: Progressing

## 2022-12-14 NOTE — PROGRESS NOTES
S: No complaints.  O: Patient Vitals for the past 24 hrs:   BP Temp Temp src Pulse Resp SpO2   12/13/22 1627 122/58 36.2 °C (97.1 °F) Temporal 74 16 94 %   12/13/22 1150 122/57 36.3 °C (97.3 °F) Temporal 77 16 95 %   12/13/22 0809 124/60 36.1 °C (97 °F) Temporal 66 18 94 %   12/13/22 0420 137/74 36.2 °C (97.1 °F) Temporal 68 17 --   12/13/22 0000 121/60 36.2 °C (97.2 °F) Temporal 73 16 --   12/12/22 2010 (!) 141/81 36.2 °C (97.2 °F) Temporal 69 17 --     EFM: Moderate variability and accelerations present.  Irregular contractions.    A: IUP 30 3/7 weeks.      Gestational hypertension, on labetalol      Possible gestational diabetes, diet.  P: Discharge today.       Follow up Dr. Briseno in 2-3 days.    Time; 15 minutes

## 2022-12-14 NOTE — DISCHARGE SUMMARY
DATE OF ADMISSION:  12/02/2022   DATE OF DISCHARGE:  12/13/2022     ADMISSION DIAGNOSES:  1.  Intrauterine pregnancy at 28 weeks and 6 days.  2.  Hypertension versus preeclampsia.  3.  History of intracranial hypertension.  4.  Major depressive disorder.  5.  Hypothyroidism.  6.  History of asthma and obesity.     DISCHARGE DIAGNOSES:  1.  Intrauterine pregnancy at 30 weeks and 3 days.  2.  Gestational hypertension.  3.  Hypertension.  4.  Major depressive disorder.  5.  Gestational diabetes.  6.  History of asthma.  7.  Multiple drug allergies.  8.  Obesity.     Received antihypertensive therapy and betamethasone.     ADMISSION HISTORY:  This is an emergency transfer for hypertension.  The patient   reports edema for the past 2 days and on results, was found to be   significantly hypertensive.     PAST MEDICAL HISTORY:  Significant for a known intracranial hypertension,   previously on acetazolamide. She also has a known history of hypothyroidism   secondary to thyroiditis and is on Synthroid .  She also has a chronic idiopathic hives previously on prednisone,   cyclosporine.  The patient does also have a history of depressive suicide   disorder.     PREVIOUS OPERATIONS: T and A.     ALLERGIES:  PATIENT HAS MULTIPLE ALLERGIES.     PHYSICAL EXAMINATION:  The patient was well developed, well nourished and   there was no acute distress evaluation.     IMAGING:  Bedside ultrasound revealed fetus to be AGA with 1412 grams.     HOSPITAL COURSE:  The patient's management was adequate with completion of   betamethasone and magnesium sulfate until completion of steroids.  After   steroids were completed, she was discontinued off the magnesium sulfate and   the patient's pressure initially was normal; however, began to become hypertensive.  She was   started on labetalol and titrated to 200 mg every 8 hours.  She also suspected   to have improved glucose control, diet and initially required NPH.  This has   now been  discontinued with the patient remaining euglycemic.  The patient has   remained relatively stable.  Her initial urine routine was abnormal, 316 mg   for 24 hours, 175 mg for 24 hours.  The patient is deemed to be discharged and   will follow up with Dr. Briseno in 2-3 days.     CONDITION ON DISCHARGE:  Satisfactory.     DISPOSITION:  Discharged home.     DISCHARGE MEDICATIONS:  Labetalol 200 mg every 8 hours.     The patient was advised to watch her diet and monitor her BP twice a day.  All questions answered to   satisfaction.        ______________________________  MD DIEGO MAI/PAULINE/JOSE M    DD:  12/13/2022 18:47  DT:  12/13/2022 20:07    Job#:  [id number]

## 2022-12-14 NOTE — DISCHARGE PLANNING
Meds-to-Beds: Discharge prescription orders listed below delivered to patient's bedside. RN Ramonita notified. Patient counseled. Patient elected to have co-payment billed to patient account.     Current Outpatient Medications   Medication Sig Dispense Refill    labetalol (NORMODYNE) 200 MG Tab Take 1 Tablet by mouth every 8 hours. 60 Tablet 0      Priti Linares, PharmD

## 2022-12-14 NOTE — DISCHARGE PLANNING
:    Received consult that Pt is losing her insurance and needs financial assistance resources.  ELEANOR met with patient and FOB.  Pt is excited that she can go home today.  Pt explained that her insurance will end in December and she would need to pay over $600 per month to keep her insurance.  Pt explained that she will need to remain on bedrest at home so she cannot go back to work.  Pt stated she applied for Medicaid, SNAP, and WIC.  ELEANOR provided pt with a children and family resource list, diaper bank referral, and food pantry/bank resources.  E-mail sent to Patient Financial Assistance.      No further needs identified at this time.  Resources left with Pt.

## 2022-12-14 NOTE — DISCHARGE INSTRUCTIONS
Pre-term Labor (<37 weeks):  Call your physician or return to the hospital if:  You have painless regular contractions more than 4 in one hour.  Your water breaks (remember time and color).  You have menstrual-like cramps, a low dull backache or pressure in your pelvis or back.  Your baby does not move enough to complete the daily kick count (10 movements in 2 hours).  Your baby moves much less often than on the days before or you have not felt your baby move all day.  Please review the MEDICATION LIST section of your AFTER VISIT SUMMARY document.  Take your medication as prescribed  Hypertension During Pregnancy  High blood pressure (hypertension) is when the force of blood pumping through the arteries is too strong. Arteries are blood vessels that carry blood from the heart throughout the body. Hypertension during pregnancy can be mild or severe. Severe hypertension during pregnancy (preeclampsia) is a medical emergency that requires prompt evaluation and treatment.  Different types of hypertension can happen during pregnancy. These include:  Chronic hypertension. This happens when you had high blood pressure before you became pregnant, and it continues during the pregnancy. Hypertension that develops before you are 20 weeks pregnant and continues during the pregnancy is also called chronic hypertension. If you have chronic hypertension, it will not go away after you have your baby. You will need follow-up visits with your health care provider after you have your baby. Your doctor may want you to keep taking medicine for your blood pressure.  Gestational hypertension. This is hypertension that develops after the 20th week of pregnancy. Gestational hypertension usually goes away after you have your baby, but your health care provider will need to monitor your blood pressure to make sure that it is getting better.  Preeclampsia. This is severe hypertension during pregnancy. This can cause serious complications for  you and your baby and can also cause complications for you after the delivery of your baby.  Postpartum preeclampsia. You may develop severe hypertension after giving birth. This usually occurs within 48 hours after childbirth but may occur up to 6 weeks after giving birth. This is rare.  How does this affect me?  Women who have hypertension during pregnancy have a greater chance of developing hypertension later in life or during future pregnancies. In some cases, hypertension during pregnancy can cause serious complications, such as:  Stroke.  Heart attack.  Injury to other organs, such as kidneys, lungs, or liver.  Preeclampsia.  Convulsions or seizures.  Placental abruption.  How does this affect my baby?  Hypertension during pregnancy can affect your baby. Your baby may:  Be born early (prematurely).  Not weigh as much as he or she should at birth (low birth weight).  Not tolerate labor well, leading to an unplanned  delivery.  What are the risks?  There are certain factors that make it more likely for you to develop hypertension during pregnancy. These include:  Having hypertension during a previous pregnancy.  Being overweight.  Being age 35 or older.  Being pregnant for the first time.  Being pregnant with more than one baby.  Becoming pregnant using fertilization methods, such as IVF (in vitro fertilization).  Having other medical problems, such as diabetes, kidney disease, or lupus.  Having a family history of hypertension.  What can I do to lower my risk?  The exact cause of hypertension during pregnancy is not known. You may be able to lower your risk by:  Maintaining a healthy weight.  Eating a healthy and balanced diet.  Following your health care provider's instructions about treating any long-term conditions that you had before becoming pregnant.  It is very important to keep all of your prenatal care appointments. Your health care provider will check your blood pressure and make sure that  your pregnancy is progressing as expected. If a problem is found, early treatment can prevent complications.  How is this treated?  Treatment for hypertension during pregnancy varies depending on the type of hypertension you have and how serious it is.  If you were taking medicine for high blood pressure before you became pregnant, talk with your health care provider. You may need to change medicine during pregnancy because some medicines, like ACE inhibitors, may not be considered safe for your baby.  If you have gestational hypertension, your health care provider may order medicine to treat this during pregnancy.  If you are at risk for preeclampsia, your health care provider may recommend that you take a low-dose aspirin during your pregnancy.  If you have severe hypertension, you may need to be hospitalized so you and your baby can be monitored closely. You may also need to be given medicine to lower your blood pressure. This medicine may be given by mouth or through an IV.  In some cases, if your condition gets worse, you may need to deliver your baby early.  Follow these instructions at home:  Eating and drinking    Drink enough fluid to keep your urine pale yellow.  Avoid caffeine.  Lifestyle  Do not use any products that contain nicotine or tobacco, such as cigarettes, e-cigarettes, and chewing tobacco. If you need help quitting, ask your health care provider.  Do not use alcohol or drugs.  Avoid stress as much as possible.  Rest and get plenty of sleep.  Regular exercise can help to reduce your blood pressure. Ask your health care provider what kinds of exercise are best for you.  General instructions  Take over-the-counter and prescription medicines only as told by your health care provider.  Keep all prenatal and follow-up visits as told by your health care provider. This is important.  Contact a health care provider if:  You have symptoms that your health care provider told you may require more treatment  or monitoring, such as:  Headaches.  Nausea or vomiting.  Abdominal pain.  Dizziness.  Light-headedness.  Get help right away if:  You have:  Severe abdominal pain that does not get better with treatment.  A severe headache that does not get better.  Vomiting that does not get better.  Sudden, rapid weight gain.  Sudden swelling in your hands, ankles, or face.  Vaginal bleeding.  Blood in your urine.  Blurred or double vision.  Shortness of breath or chest pain.  Weakness on one side of your body.  Difficulty speaking.  Your baby is not moving as much as usual.  Summary  High blood pressure (hypertension) is when the force of blood pumping through the arteries is too strong.  Hypertension during pregnancy can cause problems for you and your baby.  Treatment for hypertension during pregnancy varies depending on the type of hypertension you have and how serious it is.  Keep all prenatal and follow-up visits as told by your health care provider. This is important.  This information is not intended to replace advice given to you by your health care provider. Make sure you discuss any questions you have with your health care provider.  Document Released: 09/04/2012 Document Revised: 04/09/2020 Document Reviewed: 01/14/2020  Elsevier Patient Education © 2020 [employer]

## 2022-12-28 ENCOUNTER — HOSPITAL ENCOUNTER (INPATIENT)
Facility: MEDICAL CENTER | Age: 32
LOS: 6 days | End: 2023-01-03
Attending: OBSTETRICS & GYNECOLOGY | Admitting: OBSTETRICS & GYNECOLOGY
Payer: COMMERCIAL

## 2022-12-28 DIAGNOSIS — G89.18 POSTOPERATIVE PAIN: ICD-10-CM

## 2022-12-28 DIAGNOSIS — D62 ACUTE BLOOD LOSS ANEMIA: ICD-10-CM

## 2022-12-28 DIAGNOSIS — O13.3 GESTATIONAL HYPERTENSION, THIRD TRIMESTER: ICD-10-CM

## 2022-12-28 LAB
ALBUMIN SERPL BCP-MCNC: 3.4 G/DL (ref 3.2–4.9)
ALBUMIN/GLOB SERPL: 1.5 G/DL
ALP SERPL-CCNC: 110 U/L (ref 30–99)
ALT SERPL-CCNC: 14 U/L (ref 2–50)
ANION GAP SERPL CALC-SCNC: 11 MMOL/L (ref 7–16)
AST SERPL-CCNC: 13 U/L (ref 12–45)
BILIRUB SERPL-MCNC: <0.2 MG/DL (ref 0.1–1.5)
BUN SERPL-MCNC: 7 MG/DL (ref 8–22)
CALCIUM ALBUM COR SERPL-MCNC: 9.4 MG/DL (ref 8.5–10.5)
CALCIUM SERPL-MCNC: 8.9 MG/DL (ref 8.5–10.5)
CHLORIDE SERPL-SCNC: 106 MMOL/L (ref 96–112)
CO2 SERPL-SCNC: 22 MMOL/L (ref 20–33)
CREAT SERPL-MCNC: 0.6 MG/DL (ref 0.5–1.4)
ERYTHROCYTE [DISTWIDTH] IN BLOOD BY AUTOMATED COUNT: 46 FL (ref 35.9–50)
GFR SERPLBLD CREATININE-BSD FMLA CKD-EPI: 122 ML/MIN/1.73 M 2
GLOBULIN SER CALC-MCNC: 2.2 G/DL (ref 1.9–3.5)
GLUCOSE SERPL-MCNC: 90 MG/DL (ref 65–99)
HCT VFR BLD AUTO: 35.8 % (ref 37–47)
HGB BLD-MCNC: 11.5 G/DL (ref 12–16)
MCH RBC QN AUTO: 27.1 PG (ref 27–33)
MCHC RBC AUTO-ENTMCNC: 32.1 G/DL (ref 33.6–35)
MCV RBC AUTO: 84.2 FL (ref 81.4–97.8)
PLATELET # BLD AUTO: 169 K/UL (ref 164–446)
PMV BLD AUTO: 13 FL (ref 9–12.9)
POTASSIUM SERPL-SCNC: 4.1 MMOL/L (ref 3.6–5.5)
PROT SERPL-MCNC: 5.6 G/DL (ref 6–8.2)
RBC # BLD AUTO: 4.25 M/UL (ref 4.2–5.4)
SODIUM SERPL-SCNC: 139 MMOL/L (ref 135–145)
WBC # BLD AUTO: 11.6 K/UL (ref 4.8–10.8)

## 2022-12-28 PROCEDURE — 86901 BLOOD TYPING SEROLOGIC RH(D): CPT

## 2022-12-28 PROCEDURE — 86900 BLOOD TYPING SEROLOGIC ABO: CPT

## 2022-12-28 PROCEDURE — A9270 NON-COVERED ITEM OR SERVICE: HCPCS | Performed by: OBSTETRICS & GYNECOLOGY

## 2022-12-28 PROCEDURE — 85027 COMPLETE CBC AUTOMATED: CPT

## 2022-12-28 PROCEDURE — 86850 RBC ANTIBODY SCREEN: CPT

## 2022-12-28 PROCEDURE — 700102 HCHG RX REV CODE 250 W/ 637 OVERRIDE(OP): Performed by: OBSTETRICS & GYNECOLOGY

## 2022-12-28 PROCEDURE — 770002 HCHG ROOM/CARE - OB PRIVATE (112)

## 2022-12-28 PROCEDURE — 302790 HCHG STAT ANTEPARTUM CARE, DAILY

## 2022-12-28 PROCEDURE — 80053 COMPREHEN METABOLIC PANEL: CPT

## 2022-12-28 PROCEDURE — 36415 COLL VENOUS BLD VENIPUNCTURE: CPT

## 2022-12-28 RX ORDER — VITAMIN A ACETATE, BETA CAROTENE, ASCORBIC ACID, CHOLECALCIFEROL, .ALPHA.-TOCOPHEROL ACETATE, DL-, THIAMINE MONONITRATE, RIBOFLAVIN, NIACINAMIDE, PYRIDOXINE HYDROCHLORIDE, FOLIC ACID, CYANOCOBALAMIN, CALCIUM CARBONATE, FERROUS FUMARATE, ZINC OXIDE, CUPRIC OXIDE 3080; 12; 120; 400; 1; 1.84; 3; 20; 22; 920; 25; 200; 27; 10; 2 [IU]/1; UG/1; MG/1; [IU]/1; MG/1; MG/1; MG/1; MG/1; MG/1; [IU]/1; MG/1; MG/1; MG/1; MG/1; MG/1
1 TABLET, FILM COATED ORAL
Status: DISCONTINUED | OUTPATIENT
Start: 2022-12-28 | End: 2023-01-03 | Stop reason: HOSPADM

## 2022-12-28 RX ORDER — FEXOFENADINE HCL 60 MG/1
180 TABLET, FILM COATED ORAL 2 TIMES DAILY
Status: DISCONTINUED | OUTPATIENT
Start: 2022-12-28 | End: 2023-01-03 | Stop reason: HOSPADM

## 2022-12-28 RX ORDER — LEVOTHYROXINE SODIUM 112 UG/1
112 TABLET ORAL
Status: CANCELLED | OUTPATIENT
Start: 2022-12-29

## 2022-12-28 RX ORDER — OMEPRAZOLE 20 MG/1
20 CAPSULE, DELAYED RELEASE ORAL DAILY
Status: DISCONTINUED | OUTPATIENT
Start: 2022-12-28 | End: 2023-01-03 | Stop reason: HOSPADM

## 2022-12-28 RX ORDER — LABETALOL 300 MG/1
300 TABLET, FILM COATED ORAL EVERY 8 HOURS
Status: DISCONTINUED | OUTPATIENT
Start: 2022-12-28 | End: 2022-12-28

## 2022-12-28 RX ORDER — ACETAMINOPHEN 325 MG/1
650 TABLET ORAL ONCE
Status: DISPENSED | OUTPATIENT
Start: 2022-12-28 | End: 2022-12-29

## 2022-12-28 RX ORDER — LEVOTHYROXINE SODIUM 112 UG/1
336 TABLET ORAL
Status: DISCONTINUED | OUTPATIENT
Start: 2022-12-31 | End: 2023-01-03 | Stop reason: HOSPADM

## 2022-12-28 RX ORDER — FEXOFENADINE HCL 60 MG/1
360 TABLET, FILM COATED ORAL DAILY
Status: DISCONTINUED | OUTPATIENT
Start: 2022-12-28 | End: 2022-12-28

## 2022-12-28 RX ORDER — LEVOTHYROXINE SODIUM 112 UG/1
224 TABLET ORAL
Status: DISCONTINUED | OUTPATIENT
Start: 2022-12-29 | End: 2023-01-03 | Stop reason: HOSPADM

## 2022-12-28 RX ORDER — LABETALOL 300 MG/1
300 TABLET, FILM COATED ORAL EVERY 8 HOURS
Status: DISCONTINUED | OUTPATIENT
Start: 2022-12-28 | End: 2023-01-03 | Stop reason: HOSPADM

## 2022-12-28 RX ORDER — DOCUSATE SODIUM 100 MG/1
100 CAPSULE, LIQUID FILLED ORAL DAILY
Status: DISCONTINUED | OUTPATIENT
Start: 2022-12-29 | End: 2023-01-03 | Stop reason: HOSPADM

## 2022-12-28 RX ORDER — BUTALBITAL, ACETAMINOPHEN AND CAFFEINE 50; 325; 40 MG/1; MG/1; MG/1
1 TABLET ORAL ONCE
Status: COMPLETED | OUTPATIENT
Start: 2022-12-28 | End: 2022-12-28

## 2022-12-28 RX ORDER — SUMATRIPTAN 50 MG/1
100 TABLET, FILM COATED ORAL ONCE
Status: DISCONTINUED | OUTPATIENT
Start: 2022-12-28 | End: 2022-12-28

## 2022-12-28 RX ADMIN — BUTALBITAL, ACETAMINOPHEN, AND CAFFEINE 1 TABLET: 50; 325; 40 TABLET ORAL at 19:32

## 2022-12-28 RX ADMIN — FEXOFENADINE HYDROCHLORIDE 180 MG: 60 TABLET, FILM COATED ORAL at 14:38

## 2022-12-28 RX ADMIN — LABETALOL HYDROCHLORIDE 300 MG: 300 TABLET, FILM COATED ORAL at 19:00

## 2022-12-28 RX ADMIN — OMEPRAZOLE 20 MG: 20 CAPSULE, DELAYED RELEASE ORAL at 11:44

## 2022-12-28 ASSESSMENT — PATIENT HEALTH QUESTIONNAIRE - PHQ9
6. FEELING BAD ABOUT YOURSELF - OR THAT YOU ARE A FAILURE OR HAVE LET YOURSELF OR YOUR FAMILY DOWN: NOT AL ALL
9. THOUGHTS THAT YOU WOULD BE BETTER OFF DEAD, OR OF HURTING YOURSELF: NOT AT ALL
5. POOR APPETITE OR OVEREATING: NOT AT ALL
4. FEELING TIRED OR HAVING LITTLE ENERGY: NOT AT ALL
1. LITTLE INTEREST OR PLEASURE IN DOING THINGS: NOT AT ALL
7. TROUBLE CONCENTRATING ON THINGS, SUCH AS READING THE NEWSPAPER OR WATCHING TELEVISION: NOT AT ALL
2. FEELING DOWN, DEPRESSED, IRRITABLE, OR HOPELESS: NOT AT ALL
3. TROUBLE FALLING OR STAYING ASLEEP OR SLEEPING TOO MUCH: NOT AT ALL
SUM OF ALL RESPONSES TO PHQ9 QUESTIONS 1 AND 2: 0
8. MOVING OR SPEAKING SO SLOWLY THAT OTHER PEOPLE COULD HAVE NOTICED. OR THE OPPOSITE, BEING SO FIGETY OR RESTLESS THAT YOU HAVE BEEN MOVING AROUND A LOT MORE THAN USUAL: NOT AT ALL
SUM OF ALL RESPONSES TO PHQ QUESTIONS 1-9: 0

## 2022-12-28 ASSESSMENT — LIFESTYLE VARIABLES
EVER_SMOKED: NEVER
DOES PATIENT WANT TO STOP DRINKING: NO
ALCOHOL_USE: NO

## 2022-12-28 ASSESSMENT — FIBROSIS 4 INDEX: FIB4 SCORE: 0.59

## 2022-12-28 NOTE — PROGRESS NOTES
"1028: Patient presents to L&D after being transferred from [facility]. Patient taken to room S235. EFM monitors applied, VS taken. Patient states she has a headache that is \"worse than her normal migraines.\" Patient states she experiences migraines around once per week during pregnancy. Patient denies visual changes/epigastric pain. Patient denies UC's/LOF/VB/states normal FM. Whiteboard updated, call light at bedside.     1030: Patient voided, 24-hour urine started.    1105: Dr. Jay given report. Orders received.     1430: Dr. Jay updated on BP value. Orders to hold Imitrex and administer acetaminophen, see MAR.    1445: Dr. Jay called and updated on FHR tracing.    1810: Dr. Jay notified of deceleration at 1752. Dr Jay reviewed tracing. Orders received to increase labetalol dose, see MAR.    1830: Dr. Jay to bedside. POC discussed.     1847: BP noted to be 167/84. Patient had just returned from using restroom. BP machine set to retake BP in 30 minutes. Dr. Jay notified of BP value.    1900: Report to Aileen RAPP. POC discussed. Care relinquished.   "

## 2022-12-29 LAB
ABO GROUP BLD: NORMAL
BLD GP AB SCN SERPL QL: NORMAL
PROT 24H UR-MCNC: 196 MG/24 HR (ref 30–150)
PROT 24H UR-MRATE: 7 MG/DL (ref 0–15)
RH BLD: NORMAL
SPECIMEN VOL UR: 2800 ML
T4 FREE SERPL-MCNC: 1.11 NG/DL (ref 0.93–1.7)
TSH SERPL DL<=0.005 MIU/L-ACNC: 0.1 UIU/ML (ref 0.38–5.33)

## 2022-12-29 PROCEDURE — 84156 ASSAY OF PROTEIN URINE: CPT

## 2022-12-29 PROCEDURE — 770002 HCHG ROOM/CARE - OB PRIVATE (112)

## 2022-12-29 PROCEDURE — A9270 NON-COVERED ITEM OR SERVICE: HCPCS | Performed by: OBSTETRICS & GYNECOLOGY

## 2022-12-29 PROCEDURE — 700102 HCHG RX REV CODE 250 W/ 637 OVERRIDE(OP): Performed by: OBSTETRICS & GYNECOLOGY

## 2022-12-29 PROCEDURE — 81050 URINALYSIS VOLUME MEASURE: CPT

## 2022-12-29 PROCEDURE — 36415 COLL VENOUS BLD VENIPUNCTURE: CPT

## 2022-12-29 PROCEDURE — 302790 HCHG STAT ANTEPARTUM CARE, DAILY

## 2022-12-29 PROCEDURE — 700111 HCHG RX REV CODE 636 W/ 250 OVERRIDE (IP): Performed by: OBSTETRICS & GYNECOLOGY

## 2022-12-29 PROCEDURE — 84443 ASSAY THYROID STIM HORMONE: CPT

## 2022-12-29 PROCEDURE — 84439 ASSAY OF FREE THYROXINE: CPT

## 2022-12-29 RX ORDER — LABETALOL 100 MG/1
100 TABLET, FILM COATED ORAL EVERY 8 HOURS
Status: ON HOLD | COMMUNITY
End: 2023-01-03

## 2022-12-29 RX ORDER — CALCIUM CARBONATE 500 MG/1
500 TABLET, CHEWABLE ORAL 3 TIMES DAILY PRN
Status: DISCONTINUED | OUTPATIENT
Start: 2022-12-29 | End: 2023-01-03 | Stop reason: HOSPADM

## 2022-12-29 RX ORDER — OMEPRAZOLE 20 MG/1
20 CAPSULE, DELAYED RELEASE ORAL DAILY
COMMUNITY
Start: 2022-11-04 | End: 2023-10-30

## 2022-12-29 RX ORDER — FEXOFENADINE HCL 180 MG/1
360 TABLET ORAL DAILY
COMMUNITY

## 2022-12-29 RX ORDER — SUMATRIPTAN 100 MG/1
100 TABLET, FILM COATED ORAL
Status: ON HOLD | COMMUNITY
Start: 2022-11-04 | End: 2023-01-03

## 2022-12-29 RX ORDER — SERTRALINE HYDROCHLORIDE 100 MG/1
200 TABLET, FILM COATED ORAL DAILY
Status: DISCONTINUED | OUTPATIENT
Start: 2022-12-29 | End: 2023-01-03 | Stop reason: HOSPADM

## 2022-12-29 RX ORDER — ONDANSETRON 4 MG/1
4 TABLET, ORALLY DISINTEGRATING ORAL EVERY 4 HOURS PRN
Status: DISCONTINUED | OUTPATIENT
Start: 2022-12-29 | End: 2022-12-30

## 2022-12-29 RX ADMIN — LABETALOL HYDROCHLORIDE 300 MG: 300 TABLET, FILM COATED ORAL at 10:15

## 2022-12-29 RX ADMIN — DOCUSATE SODIUM 100 MG: 100 CAPSULE, LIQUID FILLED ORAL at 10:14

## 2022-12-29 RX ADMIN — CALCIUM CARBONATE 500 MG: 500 TABLET, CHEWABLE ORAL at 18:23

## 2022-12-29 RX ADMIN — SERTRALINE 200 MG: 100 TABLET, FILM COATED ORAL at 15:35

## 2022-12-29 RX ADMIN — LABETALOL HYDROCHLORIDE 300 MG: 300 TABLET, FILM COATED ORAL at 01:51

## 2022-12-29 RX ADMIN — LEVOTHYROXINE SODIUM 224 MCG: 0.11 TABLET ORAL at 05:24

## 2022-12-29 RX ADMIN — LABETALOL HYDROCHLORIDE 300 MG: 300 TABLET, FILM COATED ORAL at 17:47

## 2022-12-29 RX ADMIN — ONDANSETRON 4 MG: 4 TABLET, ORALLY DISINTEGRATING ORAL at 12:50

## 2022-12-29 RX ADMIN — FEXOFENADINE HYDROCHLORIDE 180 MG: 60 TABLET, FILM COATED ORAL at 10:13

## 2022-12-29 RX ADMIN — OMEPRAZOLE 20 MG: 20 CAPSULE, DELAYED RELEASE ORAL at 10:14

## 2022-12-29 RX ADMIN — PRENATAL WITH FERROUS FUM AND FOLIC ACID 1 TABLET: 3080; 920; 120; 400; 22; 1.84; 3; 20; 10; 1; 12; 200; 27; 25; 2 TABLET ORAL at 10:13

## 2022-12-29 RX ADMIN — FEXOFENADINE HYDROCHLORIDE 180 MG: 60 TABLET, FILM COATED ORAL at 17:47

## 2022-12-29 NOTE — CARE PLAN
The patient is Watcher - Medium risk of patient condition declining or worsening    Shift Goals  Clinical Goals: maintain stable BP's  Patient Goals: healthy baby, healthy mom  Family Goals: support    Progress made toward(s) clinical / shift goals:        Patient is not progressing towards the following goals:n/a

## 2022-12-29 NOTE — H&P
DATE OF ADMISSION:  2022     REASON FOR ADMISSION:  Hypertension and nonreassuring fetal heart rate   tracing.     HISTORY OF PRESENT ILLNESS:  This is a 32-year-old , EDC 2023,   currently at 32 weeks and 4 days with a working EDC of 2023.     The patient is known to me and was previously admitted on 2022 for   possible preeclampsia.  The patient remained in the hospital until 2022   when the patient has been determined to be stable.  She was at that time   discharged on labetalol 200 mg every 8 hours.  The patient had mild   hyperglycemia after receiving betamethasone on the 2nd and her thyroid status   remained stable.  Her initial urine protein at that admission was 316 mg in 24   hours; however, repeat was 175 mg in 24 hours.     The patient reports that for the past few days, she has been in and out the   hospital for biophysical profiles due to nonreactive NSTs.  I was contacted by   Dr. Briseno yesterday for the patient showing unstable blood pressures, but   also nonreactive tracing and the biophysical was 6/8.     Due to continued status, the patient was transferred today for further   management and because of her potential risk for  birth.     She is known to have intracranial hypertension and was previously on   acetazolamide, but this has been discontinued.  She reports that she has   chronic migraine headaches, but is believed to be secondary to her   intracranial hypertension.  She also has hypothyroid secondary due to   Hashimoto's thyroiditis, currently on Synthroid 224 mcg for 3 days, 336 for 1   day on the fourth day and in a cyclic fashion.     She has also history of chronic idiopathic hives for which she has previously   been on prednisone and cyclosporine.  Again, she has stopped these   medications.  She does have a history of major depressive disorder, previous   suicide ideation in , was actually seen by psychiatry in her last   hospitalization  and deemed to be stable.     PAST MEDICAL HISTORY:  She denies any current history of seizures, previous   hypertension, diabetes, cardiovascular, respiratory, GI,  or other endocrine   disorders.     PREVIOUS OPERATIONS:  T and A and partial thyroidectomy and laparoscopy.     TRANSFUSIONS:  None.     ALLERGIES:  LAMOTRIGINE CAUSES HIVES.  TOPAMAX, HIVES. COMPAZINE, INCREASED   HEART RATE. AZITHROMYCIN, HIVES.     HABITS:  None.     VENEREAL DISEASE HISTORY:  Negative.     CURRENT MEDICATIONS:  Synthroid 224 mcg daily for 3 days followed by 336 mcg   on the fourth day in a cyclic fashion.  Zoloft 200 mg per day and albuterol   p.r.n.     PHYSICAL EXAMINATION:  GENERAL:  Well-developed, well-nourished, alert, obese female.  The patient is   in moderate discomfort due to headaches.  VITAL SIGNS:  Her most recent blood pressure was 157/76.  Pulse rate 85.  Her   BMI is 44.7.  HEENT:  Within normal limits.  LUNGS:  Clear.  HEART:  Regular rate and rhythm, normal S1 and S2.  No S3, S4 or murmurs.  ABDOMEN:  Obese, gravid.  EXTREMITIES:  No edema or varicosities.  PELVIC:  Fetal heart rate tracing shows moderate variability and accelerations   are present on admission.     There are variable decelerations isolated what may potentially be a late   deceleration; however, they were nonrecurring.     LABORATORY DATA:  The patient's blood type is O positive.     ASSESSMENT:  1.  Intrauterine pregnancy at 32 weeks and 4 days.  2.  Gestational hypertension with exacerbation.  3.  Intracranial hypertension.  4.  Hypothyroidism.  5.  Anxiety/depression disorder.     PLAN:  At the present time, we will maintain her thyroid medications and she   was asking for Imitrex and I told her due to uncontrolled hypertension,   Imitrex is contraindicated.  We will try a single dose of Fioricet.     We will leave her on continuous monitoring due to the variable decelerations   and if they should become recurring, then delivery may be  indicated.  We   discussed that the goal would be to at least get her to 34 weeks or preferably   longer.  She may be in the hospital for the duration of this pregnancy.    Time: 75 minutes    ______________________________  MD DIEGO MAI/[facility]    DD:  12/28/2022 18:47  DT:  12/28/2022 20:06    Job#:  [id number]

## 2022-12-29 NOTE — PROGRESS NOTES
0700- report received from Bridget RAPP ( Paige )    0900- assessment done. Patient denies any contractions, leaking of fluid or any vaginal bleeding. States positive fetal movement.     1530- Dr Pak updated on patients tracing, will be by later to see patient.     1700-strip reviewed by Dr Jay.  Dr Jay into see patient discussed plan of care. patient questions answered    1900-report given to Yoana RAPP

## 2022-12-29 NOTE — PROGRESS NOTES
Med Rec Complete per patient  Allergies Reviewed with patient  No antibiotics within the last 30 days  Patient's Preferred Pharmacy: [facility]  in [place], [place]

## 2022-12-30 ENCOUNTER — ANESTHESIA (OUTPATIENT)
Dept: OBGYN | Facility: MEDICAL CENTER | Age: 32
End: 2022-12-30
Payer: COMMERCIAL

## 2022-12-30 ENCOUNTER — ANESTHESIA EVENT (OUTPATIENT)
Dept: OBGYN | Facility: MEDICAL CENTER | Age: 32
End: 2022-12-30
Payer: COMMERCIAL

## 2022-12-30 PROBLEM — K21.9 GASTROESOPHAGEAL REFLUX DISEASE: Status: ACTIVE | Noted: 2022-12-30

## 2022-12-30 PROCEDURE — A9270 NON-COVERED ITEM OR SERVICE: HCPCS | Performed by: ANESTHESIOLOGY

## 2022-12-30 PROCEDURE — 700102 HCHG RX REV CODE 250 W/ 637 OVERRIDE(OP): Performed by: OBSTETRICS & GYNECOLOGY

## 2022-12-30 PROCEDURE — 01961 ANES CESAREAN DELIVERY ONLY: CPT | Performed by: ANESTHESIOLOGY

## 2022-12-30 PROCEDURE — 770002 HCHG ROOM/CARE - OB PRIVATE (112)

## 2022-12-30 PROCEDURE — 700111 HCHG RX REV CODE 636 W/ 250 OVERRIDE (IP): Performed by: ANESTHESIOLOGY

## 2022-12-30 PROCEDURE — 700105 HCHG RX REV CODE 258: Performed by: ANESTHESIOLOGY

## 2022-12-30 PROCEDURE — 160036 HCHG PACU - EA ADDL 30 MINS PHASE I: Performed by: OBSTETRICS & GYNECOLOGY

## 2022-12-30 PROCEDURE — 160041 HCHG SURGERY MINUTES - EA ADDL 1 MIN LEVEL 4: Performed by: OBSTETRICS & GYNECOLOGY

## 2022-12-30 PROCEDURE — 700111 HCHG RX REV CODE 636 W/ 250 OVERRIDE (IP)

## 2022-12-30 PROCEDURE — 160002 HCHG RECOVERY MINUTES (STAT): Performed by: OBSTETRICS & GYNECOLOGY

## 2022-12-30 PROCEDURE — 160029 HCHG SURGERY MINUTES - 1ST 30 MINS LEVEL 4: Performed by: OBSTETRICS & GYNECOLOGY

## 2022-12-30 PROCEDURE — C1755 CATHETER, INTRASPINAL: HCPCS | Performed by: OBSTETRICS & GYNECOLOGY

## 2022-12-30 PROCEDURE — 160035 HCHG PACU - 1ST 60 MINS PHASE I: Performed by: OBSTETRICS & GYNECOLOGY

## 2022-12-30 PROCEDURE — A9270 NON-COVERED ITEM OR SERVICE: HCPCS | Performed by: OBSTETRICS & GYNECOLOGY

## 2022-12-30 PROCEDURE — 700102 HCHG RX REV CODE 250 W/ 637 OVERRIDE(OP): Performed by: ANESTHESIOLOGY

## 2022-12-30 PROCEDURE — 99140 ANES COMP EMERGENCY COND: CPT | Performed by: ANESTHESIOLOGY

## 2022-12-30 PROCEDURE — 160048 HCHG OR STATISTICAL LEVEL 1-5: Performed by: OBSTETRICS & GYNECOLOGY

## 2022-12-30 PROCEDURE — 88307 TISSUE EXAM BY PATHOLOGIST: CPT

## 2022-12-30 PROCEDURE — 700101 HCHG RX REV CODE 250: Performed by: ANESTHESIOLOGY

## 2022-12-30 PROCEDURE — 700105 HCHG RX REV CODE 258: Performed by: OBSTETRICS & GYNECOLOGY

## 2022-12-30 PROCEDURE — 160009 HCHG ANES TIME/MIN: Performed by: OBSTETRICS & GYNECOLOGY

## 2022-12-30 PROCEDURE — 700111 HCHG RX REV CODE 636 W/ 250 OVERRIDE (IP): Performed by: OBSTETRICS & GYNECOLOGY

## 2022-12-30 RX ORDER — SODIUM CHLORIDE, SODIUM LACTATE, POTASSIUM CHLORIDE, CALCIUM CHLORIDE 600; 310; 30; 20 MG/100ML; MG/100ML; MG/100ML; MG/100ML
INJECTION, SOLUTION INTRAVENOUS PRN
Status: DISCONTINUED | OUTPATIENT
Start: 2022-12-30 | End: 2023-01-03 | Stop reason: HOSPADM

## 2022-12-30 RX ORDER — ONDANSETRON 2 MG/ML
4 INJECTION INTRAMUSCULAR; INTRAVENOUS EVERY 6 HOURS PRN
Status: ACTIVE | OUTPATIENT
Start: 2022-12-30 | End: 2022-12-31

## 2022-12-30 RX ORDER — SODIUM CHLORIDE, SODIUM LACTATE, POTASSIUM CHLORIDE, CALCIUM CHLORIDE 600; 310; 30; 20 MG/100ML; MG/100ML; MG/100ML; MG/100ML
INJECTION, SOLUTION INTRAVENOUS CONTINUOUS
Status: DISCONTINUED | OUTPATIENT
Start: 2022-12-30 | End: 2023-01-03 | Stop reason: HOSPADM

## 2022-12-30 RX ORDER — HYDROMORPHONE HYDROCHLORIDE 1 MG/ML
0.4 INJECTION, SOLUTION INTRAMUSCULAR; INTRAVENOUS; SUBCUTANEOUS
Status: ACTIVE | OUTPATIENT
Start: 2022-12-30 | End: 2022-12-31

## 2022-12-30 RX ORDER — SODIUM CHLORIDE, SODIUM GLUCONATE, SODIUM ACETATE, POTASSIUM CHLORIDE AND MAGNESIUM CHLORIDE 526; 502; 368; 37; 30 MG/100ML; MG/100ML; MG/100ML; MG/100ML; MG/100ML
1500 INJECTION, SOLUTION INTRAVENOUS ONCE
Status: COMPLETED | OUTPATIENT
Start: 2022-12-30 | End: 2022-12-30

## 2022-12-30 RX ORDER — ONDANSETRON 2 MG/ML
4 INJECTION INTRAMUSCULAR; INTRAVENOUS EVERY 6 HOURS PRN
Status: DISCONTINUED | OUTPATIENT
Start: 2022-12-30 | End: 2023-01-03 | Stop reason: HOSPADM

## 2022-12-30 RX ORDER — LABETALOL 300 MG/1
300 TABLET, FILM COATED ORAL EVERY 8 HOURS
Status: DISCONTINUED | OUTPATIENT
Start: 2022-12-30 | End: 2022-12-30

## 2022-12-30 RX ORDER — OXYCODONE HYDROCHLORIDE 10 MG/1
10 TABLET ORAL EVERY 4 HOURS PRN
Status: DISPENSED | OUTPATIENT
Start: 2022-12-30 | End: 2022-12-31

## 2022-12-30 RX ORDER — CEFAZOLIN SODIUM 1 G/3ML
INJECTION, POWDER, FOR SOLUTION INTRAMUSCULAR; INTRAVENOUS PRN
Status: DISCONTINUED | OUTPATIENT
Start: 2022-12-30 | End: 2022-12-30 | Stop reason: SURG

## 2022-12-30 RX ORDER — BUTALBITAL, ACETAMINOPHEN AND CAFFEINE 50; 325; 40 MG/1; MG/1; MG/1
1 TABLET ORAL ONCE
Status: COMPLETED | OUTPATIENT
Start: 2022-12-30 | End: 2022-12-30

## 2022-12-30 RX ORDER — DEXTROSE, SODIUM CHLORIDE, SODIUM LACTATE, POTASSIUM CHLORIDE, AND CALCIUM CHLORIDE 5; .6; .31; .03; .02 G/100ML; G/100ML; G/100ML; G/100ML; G/100ML
INJECTION, SOLUTION INTRAVENOUS CONTINUOUS
Status: DISCONTINUED | OUTPATIENT
Start: 2022-12-30 | End: 2023-01-03 | Stop reason: HOSPADM

## 2022-12-30 RX ORDER — ACETAMINOPHEN 500 MG
1000 TABLET ORAL EVERY 6 HOURS
Status: DISCONTINUED | OUTPATIENT
Start: 2023-01-01 | End: 2023-01-03 | Stop reason: HOSPADM

## 2022-12-30 RX ORDER — DIPHENHYDRAMINE HYDROCHLORIDE 50 MG/ML
25 INJECTION INTRAMUSCULAR; INTRAVENOUS EVERY 6 HOURS PRN
Status: ACTIVE | OUTPATIENT
Start: 2022-12-30 | End: 2022-12-31

## 2022-12-30 RX ORDER — OXYCODONE HCL 5 MG/5 ML
10 SOLUTION, ORAL ORAL
Status: CANCELLED | OUTPATIENT
Start: 2022-12-30

## 2022-12-30 RX ORDER — KETOROLAC TROMETHAMINE 30 MG/ML
30 INJECTION, SOLUTION INTRAMUSCULAR; INTRAVENOUS EVERY 6 HOURS
Status: COMPLETED | OUTPATIENT
Start: 2022-12-31 | End: 2022-12-31

## 2022-12-30 RX ORDER — DIPHENHYDRAMINE HCL 25 MG
25 TABLET ORAL EVERY 6 HOURS PRN
Status: DISCONTINUED | OUTPATIENT
Start: 2022-12-31 | End: 2023-01-03 | Stop reason: HOSPADM

## 2022-12-30 RX ORDER — SODIUM CHLORIDE, SODIUM LACTATE, POTASSIUM CHLORIDE, CALCIUM CHLORIDE 600; 310; 30; 20 MG/100ML; MG/100ML; MG/100ML; MG/100ML
INJECTION, SOLUTION INTRAVENOUS CONTINUOUS
Status: CANCELLED | OUTPATIENT
Start: 2022-12-30

## 2022-12-30 RX ORDER — IBUPROFEN 800 MG/1
800 TABLET ORAL EVERY 8 HOURS PRN
Status: DISCONTINUED | OUTPATIENT
Start: 2023-01-04 | End: 2023-01-03 | Stop reason: HOSPADM

## 2022-12-30 RX ORDER — ONDANSETRON 2 MG/ML
INJECTION INTRAMUSCULAR; INTRAVENOUS PRN
Status: DISCONTINUED | OUTPATIENT
Start: 2022-12-30 | End: 2022-12-30 | Stop reason: SURG

## 2022-12-30 RX ORDER — HYDROMORPHONE HYDROCHLORIDE 1 MG/ML
0.4 INJECTION, SOLUTION INTRAMUSCULAR; INTRAVENOUS; SUBCUTANEOUS
Status: CANCELLED | OUTPATIENT
Start: 2022-12-30

## 2022-12-30 RX ORDER — MORPHINE SULFATE 0.5 MG/ML
INJECTION, SOLUTION EPIDURAL; INTRATHECAL; INTRAVENOUS
Status: COMPLETED | OUTPATIENT
Start: 2022-12-30 | End: 2022-12-30

## 2022-12-30 RX ORDER — DIPHENHYDRAMINE HYDROCHLORIDE 50 MG/ML
25 INJECTION INTRAMUSCULAR; INTRAVENOUS EVERY 6 HOURS PRN
Status: DISCONTINUED | OUTPATIENT
Start: 2022-12-31 | End: 2023-01-03 | Stop reason: HOSPADM

## 2022-12-30 RX ORDER — OXYCODONE HYDROCHLORIDE 5 MG/1
5 TABLET ORAL EVERY 4 HOURS PRN
Status: ACTIVE | OUTPATIENT
Start: 2022-12-30 | End: 2022-12-31

## 2022-12-30 RX ORDER — HYDROMORPHONE HYDROCHLORIDE 1 MG/ML
0.1 INJECTION, SOLUTION INTRAMUSCULAR; INTRAVENOUS; SUBCUTANEOUS
Status: CANCELLED | OUTPATIENT
Start: 2022-12-30

## 2022-12-30 RX ORDER — KETOROLAC TROMETHAMINE 30 MG/ML
INJECTION, SOLUTION INTRAMUSCULAR; INTRAVENOUS
Status: COMPLETED
Start: 2022-12-30 | End: 2022-12-30

## 2022-12-30 RX ORDER — CITRIC ACID/SODIUM CITRATE 334-500MG
30 SOLUTION, ORAL ORAL ONCE
Status: COMPLETED | OUTPATIENT
Start: 2022-12-30 | End: 2022-12-30

## 2022-12-30 RX ORDER — DOCUSATE SODIUM 100 MG/1
100 CAPSULE, LIQUID FILLED ORAL 2 TIMES DAILY PRN
Status: DISCONTINUED | OUTPATIENT
Start: 2022-12-30 | End: 2023-01-03 | Stop reason: HOSPADM

## 2022-12-30 RX ORDER — DIPHENHYDRAMINE HYDROCHLORIDE 50 MG/ML
12.5 INJECTION INTRAMUSCULAR; INTRAVENOUS EVERY 6 HOURS PRN
Status: ACTIVE | OUTPATIENT
Start: 2022-12-30 | End: 2022-12-31

## 2022-12-30 RX ORDER — IBUPROFEN 800 MG/1
800 TABLET ORAL EVERY 8 HOURS
Status: DISCONTINUED | OUTPATIENT
Start: 2023-01-01 | End: 2023-01-03 | Stop reason: HOSPADM

## 2022-12-30 RX ORDER — OXYCODONE HYDROCHLORIDE 5 MG/1
5 TABLET ORAL EVERY 4 HOURS PRN
Status: DISCONTINUED | OUTPATIENT
Start: 2022-12-31 | End: 2023-01-03 | Stop reason: HOSPADM

## 2022-12-30 RX ORDER — HYDROMORPHONE HYDROCHLORIDE 1 MG/ML
0.2 INJECTION, SOLUTION INTRAMUSCULAR; INTRAVENOUS; SUBCUTANEOUS
Status: CANCELLED | OUTPATIENT
Start: 2022-12-30

## 2022-12-30 RX ORDER — OXYTOCIN 10 [USP'U]/ML
10 INJECTION, SOLUTION INTRAMUSCULAR; INTRAVENOUS
Status: DISCONTINUED | OUTPATIENT
Start: 2022-12-30 | End: 2023-01-03 | Stop reason: HOSPADM

## 2022-12-30 RX ORDER — HYDROMORPHONE HYDROCHLORIDE 1 MG/ML
0.2 INJECTION, SOLUTION INTRAMUSCULAR; INTRAVENOUS; SUBCUTANEOUS
Status: ACTIVE | OUTPATIENT
Start: 2022-12-30 | End: 2022-12-31

## 2022-12-30 RX ORDER — ONDANSETRON 2 MG/ML
4 INJECTION INTRAMUSCULAR; INTRAVENOUS
Status: CANCELLED | OUTPATIENT
Start: 2022-12-30

## 2022-12-30 RX ORDER — BUPIVACAINE HYDROCHLORIDE 7.5 MG/ML
INJECTION, SOLUTION INTRASPINAL
Status: COMPLETED | OUTPATIENT
Start: 2022-12-30 | End: 2022-12-30

## 2022-12-30 RX ORDER — CEFAZOLIN SODIUM 1 G/3ML
2 INJECTION, POWDER, FOR SOLUTION INTRAMUSCULAR; INTRAVENOUS ONCE
Status: DISCONTINUED | OUTPATIENT
Start: 2022-12-30 | End: 2022-12-30 | Stop reason: HOSPADM

## 2022-12-30 RX ORDER — METOCLOPRAMIDE HYDROCHLORIDE 5 MG/ML
10 INJECTION INTRAMUSCULAR; INTRAVENOUS ONCE
Status: COMPLETED | OUTPATIENT
Start: 2022-12-30 | End: 2022-12-30

## 2022-12-30 RX ORDER — ACETAMINOPHEN 500 MG
1000 TABLET ORAL EVERY 6 HOURS
Status: DISPENSED | OUTPATIENT
Start: 2022-12-30 | End: 2022-12-31

## 2022-12-30 RX ORDER — OXYCODONE HYDROCHLORIDE 10 MG/1
10 TABLET ORAL EVERY 4 HOURS PRN
Status: DISCONTINUED | OUTPATIENT
Start: 2022-12-31 | End: 2023-01-03 | Stop reason: HOSPADM

## 2022-12-30 RX ORDER — ACETAMINOPHEN 500 MG
1000 TABLET ORAL EVERY 6 HOURS PRN
Status: DISCONTINUED | OUTPATIENT
Start: 2023-01-04 | End: 2023-01-03 | Stop reason: HOSPADM

## 2022-12-30 RX ORDER — SODIUM CHLORIDE, SODIUM GLUCONATE, SODIUM ACETATE, POTASSIUM CHLORIDE AND MAGNESIUM CHLORIDE 526; 502; 368; 37; 30 MG/100ML; MG/100ML; MG/100ML; MG/100ML; MG/100ML
INJECTION, SOLUTION INTRAVENOUS
Status: DISCONTINUED | OUTPATIENT
Start: 2022-12-30 | End: 2022-12-30 | Stop reason: SURG

## 2022-12-30 RX ORDER — ONDANSETRON 4 MG/1
4 TABLET, ORALLY DISINTEGRATING ORAL EVERY 6 HOURS PRN
Status: DISCONTINUED | OUTPATIENT
Start: 2022-12-30 | End: 2023-01-03 | Stop reason: HOSPADM

## 2022-12-30 RX ORDER — MEPERIDINE HYDROCHLORIDE 25 MG/ML
12.5 INJECTION INTRAMUSCULAR; INTRAVENOUS; SUBCUTANEOUS
Status: CANCELLED | OUTPATIENT
Start: 2022-12-30

## 2022-12-30 RX ORDER — DEXAMETHASONE SODIUM PHOSPHATE 4 MG/ML
INJECTION, SOLUTION INTRA-ARTICULAR; INTRALESIONAL; INTRAMUSCULAR; INTRAVENOUS; SOFT TISSUE PRN
Status: DISCONTINUED | OUTPATIENT
Start: 2022-12-30 | End: 2022-12-30 | Stop reason: SURG

## 2022-12-30 RX ORDER — SODIUM CHLORIDE, SODIUM LACTATE, POTASSIUM CHLORIDE, CALCIUM CHLORIDE 600; 310; 30; 20 MG/100ML; MG/100ML; MG/100ML; MG/100ML
INJECTION, SOLUTION INTRAVENOUS ONCE
Status: ACTIVE | OUTPATIENT
Start: 2022-12-30 | End: 2022-12-31

## 2022-12-30 RX ORDER — OXYCODONE HCL 5 MG/5 ML
5 SOLUTION, ORAL ORAL
Status: CANCELLED | OUTPATIENT
Start: 2022-12-30

## 2022-12-30 RX ORDER — MISOPROSTOL 200 UG/1
800 TABLET ORAL
Status: DISCONTINUED | OUTPATIENT
Start: 2022-12-30 | End: 2023-01-03 | Stop reason: HOSPADM

## 2022-12-30 RX ADMIN — MORPHINE SULFATE 150 MCG: 0.5 INJECTION, SOLUTION EPIDURAL; INTRATHECAL; INTRAVENOUS at 19:23

## 2022-12-30 RX ADMIN — DEXAMETHASONE SODIUM PHOSPHATE 8 MG: 4 INJECTION, SOLUTION INTRA-ARTICULAR; INTRALESIONAL; INTRAMUSCULAR; INTRAVENOUS; SOFT TISSUE at 19:54

## 2022-12-30 RX ADMIN — LABETALOL HYDROCHLORIDE 300 MG: 300 TABLET, FILM COATED ORAL at 10:22

## 2022-12-30 RX ADMIN — OXYTOCIN 1000 ML: 10 INJECTION, SOLUTION INTRAMUSCULAR; INTRAVENOUS at 19:53

## 2022-12-30 RX ADMIN — ONDANSETRON 4 MG: 2 INJECTION INTRAMUSCULAR; INTRAVENOUS at 19:37

## 2022-12-30 RX ADMIN — FENTANYL CITRATE 10 MCG: 50 INJECTION, SOLUTION INTRAMUSCULAR; INTRAVENOUS at 19:23

## 2022-12-30 RX ADMIN — OXYTOCIN 125 ML/HR: 10 INJECTION, SOLUTION INTRAMUSCULAR; INTRAVENOUS at 21:52

## 2022-12-30 RX ADMIN — OMEPRAZOLE 20 MG: 20 CAPSULE, DELAYED RELEASE ORAL at 09:03

## 2022-12-30 RX ADMIN — BUPIVACAINE HYDROCHLORIDE IN DEXTROSE 1.6 ML: 7.5 INJECTION, SOLUTION SUBARACHNOID at 19:23

## 2022-12-30 RX ADMIN — BUTALBITAL, ACETAMINOPHEN, AND CAFFEINE 1 TABLET: 50; 325; 40 TABLET ORAL at 18:00

## 2022-12-30 RX ADMIN — KETOROLAC TROMETHAMINE 30 MG: 30 INJECTION, SOLUTION INTRAMUSCULAR; INTRAVENOUS at 21:09

## 2022-12-30 RX ADMIN — SODIUM CITRATE AND CITRIC ACID MONOHYDRATE 30 ML: 500; 334 SOLUTION ORAL at 18:38

## 2022-12-30 RX ADMIN — ONDANSETRON 4 MG: 4 TABLET, ORALLY DISINTEGRATING ORAL at 09:42

## 2022-12-30 RX ADMIN — LABETALOL HYDROCHLORIDE 300 MG: 300 TABLET, FILM COATED ORAL at 02:16

## 2022-12-30 RX ADMIN — LEVOTHYROXINE SODIUM 224 MCG: 0.11 TABLET ORAL at 05:34

## 2022-12-30 RX ADMIN — CEFAZOLIN 2 G: 330 INJECTION, POWDER, FOR SOLUTION INTRAMUSCULAR; INTRAVENOUS at 19:30

## 2022-12-30 RX ADMIN — METOCLOPRAMIDE 10 MG: 5 INJECTION, SOLUTION INTRAMUSCULAR; INTRAVENOUS at 18:37

## 2022-12-30 RX ADMIN — SODIUM CHLORIDE, SODIUM LACTATE, POTASSIUM CHLORIDE, CALCIUM CHLORIDE AND DEXTROSE MONOHYDRATE: 5; 600; 310; 30; 20 INJECTION, SOLUTION INTRAVENOUS at 17:07

## 2022-12-30 RX ADMIN — SODIUM CHLORIDE, SODIUM LACTATE, POTASSIUM CHLORIDE, CALCIUM CHLORIDE AND DEXTROSE MONOHYDRATE: 5; 600; 310; 30; 20 INJECTION, SOLUTION INTRAVENOUS at 09:24

## 2022-12-30 RX ADMIN — LABETALOL HYDROCHLORIDE 300 MG: 300 TABLET, FILM COATED ORAL at 18:09

## 2022-12-30 RX ADMIN — SODIUM CHLORIDE, SODIUM GLUCONATE, SODIUM ACETATE, POTASSIUM CHLORIDE AND MAGNESIUM CHLORIDE: 526; 502; 368; 37; 30 INJECTION, SOLUTION INTRAVENOUS at 19:17

## 2022-12-30 RX ADMIN — SODIUM CHLORIDE, SODIUM GLUCONATE, SODIUM ACETATE, POTASSIUM CHLORIDE AND MAGNESIUM CHLORIDE 1500 ML: 526; 502; 368; 37; 30 INJECTION, SOLUTION INTRAVENOUS at 18:23

## 2022-12-30 RX ADMIN — OXYCODONE HYDROCHLORIDE 10 MG: 10 TABLET ORAL at 21:58

## 2022-12-30 RX ADMIN — FAMOTIDINE 20 MG: 10 INJECTION, SOLUTION INTRAVENOUS at 18:37

## 2022-12-30 RX ADMIN — PHENYLEPHRINE HYDROCHLORIDE 0.5 MCG/KG/MIN: 10 INJECTION INTRAVENOUS at 19:30

## 2022-12-30 RX ADMIN — SERTRALINE 200 MG: 100 TABLET, FILM COATED ORAL at 09:04

## 2022-12-30 ASSESSMENT — PAIN SCALES - GENERAL: PAIN_LEVEL: 2

## 2022-12-30 ASSESSMENT — PAIN DESCRIPTION - PAIN TYPE: TYPE: ACUTE PAIN;SURGICAL PAIN

## 2022-12-30 NOTE — PROGRESS NOTES
0800:  Call made to Dr. Jay regarding the FHT, 3 minute prolonged decel. Orders to keep the pt on continuous monitoring.

## 2022-12-30 NOTE — PROGRESS NOTES
S: No complaints except for headache.  O: Patient Vitals for the past 24 hrs:   BP Temp Temp src Pulse Resp SpO2   12/29/22 1605 (!) 140/76 36.4 °C (97.6 °F) Temporal 85 18 --   12/29/22 1153 136/85 36.3 °C (97.4 °F) Temporal 88 18 --   12/29/22 1030 (!) 157/80 -- -- 80 -- --   12/29/22 0900 -- -- -- 82 -- 95 %   12/29/22 0824 139/73 36.2 °C (97.2 °F) Temporal 79 19 96 %   12/29/22 0434 (!) 140/76 36.6 °C (97.8 °F) Temporal 78 18 95 %   12/29/22 0150 (!) 140/85 -- -- 77 18 --   12/29/22 0042 (!) 143/89 -- -- 78 -- --   12/28/22 1927 (!) 148/76 36.7 °C (98.1 °F) Temporal 86 18 --   12/28/22 1748 (!) 157/76 -- -- 85 -- --     EFM: Moderate variability and accelerations present.  Variable decelerations to 90 bpm.   Latest Reference Range & Units 12/29/22 10:30   Total Protein, 24 Hour Urine 30.0 - 150.0 mg/24 Hr 196.0 (H)   (H): Data is abnormally high   Latest Reference Range & Units 12/29/22 04:39   TSH 0.380 - 5.330 uIU/mL 0.100 (L)   Free T-4 0.93 - 1.70 ng/dL 1.11   (L): Data is abnormally low    A: IUP 32 5/7 weeks       Gestational hypertension       Intracranial hypertension      Hypothyroidism  P: Continue with monitoring.       Continue with labetalol 300 mg TID       Delivery for severe hypertension or non reassuring fetal status.    Time: 15 minutes

## 2022-12-30 NOTE — PROGRESS NOTES
0700: Report received from Yoana PINZON RN. POC discussed.     0830: Assessment done. Pt denies LOF, VB, or UCs. Reports + FM. Discussed POC with pt. Encouraged pt to call with needs.     0840: Dr. Jay notified of FHT decelerations. MD to review tracing and contact RN.     0849 Dr. Jay called RN. Orders received.     0935: Notified Dr. Jay of prolonged FHT deceleration.     1214: Dr. Jay notified of FHT decelerations. MD ordering for pt to remain on continuous monitoring and NPO.    1600: Dr. Jay notified of FHT decelerations. MD to come to bedside to assess pt.     1750: Dr. Jay reviewed tracing and to bedside. POC discussed with pt. C-section called. All questions answered.     1805: Report given to Davina PINZON RN. POC discussed.

## 2022-12-31 LAB
ERYTHROCYTE [DISTWIDTH] IN BLOOD BY AUTOMATED COUNT: 47.7 FL (ref 35.9–50)
HCT VFR BLD AUTO: 34.4 % (ref 37–47)
HGB BLD-MCNC: 11 G/DL (ref 12–16)
MCH RBC QN AUTO: 27.2 PG (ref 27–33)
MCHC RBC AUTO-ENTMCNC: 32 G/DL (ref 33.6–35)
MCV RBC AUTO: 85.1 FL (ref 81.4–97.8)
PLATELET # BLD AUTO: 177 K/UL (ref 164–446)
PMV BLD AUTO: 12.9 FL (ref 9–12.9)
RBC # BLD AUTO: 4.04 M/UL (ref 4.2–5.4)
WBC # BLD AUTO: 17 K/UL (ref 4.8–10.8)

## 2022-12-31 PROCEDURE — 85027 COMPLETE CBC AUTOMATED: CPT

## 2022-12-31 PROCEDURE — 700102 HCHG RX REV CODE 250 W/ 637 OVERRIDE(OP): Performed by: ANESTHESIOLOGY

## 2022-12-31 PROCEDURE — 700111 HCHG RX REV CODE 636 W/ 250 OVERRIDE (IP): Performed by: ANESTHESIOLOGY

## 2022-12-31 PROCEDURE — A9270 NON-COVERED ITEM OR SERVICE: HCPCS | Performed by: OBSTETRICS & GYNECOLOGY

## 2022-12-31 PROCEDURE — 700102 HCHG RX REV CODE 250 W/ 637 OVERRIDE(OP): Performed by: OBSTETRICS & GYNECOLOGY

## 2022-12-31 PROCEDURE — 770002 HCHG ROOM/CARE - OB PRIVATE (112)

## 2022-12-31 PROCEDURE — 36415 COLL VENOUS BLD VENIPUNCTURE: CPT

## 2022-12-31 PROCEDURE — A9270 NON-COVERED ITEM OR SERVICE: HCPCS | Performed by: ANESTHESIOLOGY

## 2022-12-31 RX ADMIN — LABETALOL HYDROCHLORIDE 300 MG: 300 TABLET, FILM COATED ORAL at 10:30

## 2022-12-31 RX ADMIN — LABETALOL HYDROCHLORIDE 300 MG: 300 TABLET, FILM COATED ORAL at 17:18

## 2022-12-31 RX ADMIN — LEVOTHYROXINE SODIUM 336 MCG: 0.11 TABLET ORAL at 05:27

## 2022-12-31 RX ADMIN — OXYCODONE HYDROCHLORIDE 10 MG: 10 TABLET ORAL at 22:16

## 2022-12-31 RX ADMIN — KETOROLAC TROMETHAMINE 30 MG: 30 INJECTION, SOLUTION INTRAMUSCULAR; INTRAVENOUS at 15:27

## 2022-12-31 RX ADMIN — OMEPRAZOLE 20 MG: 20 CAPSULE, DELAYED RELEASE ORAL at 08:46

## 2022-12-31 RX ADMIN — PRENATAL WITH FERROUS FUM AND FOLIC ACID 1 TABLET: 3080; 920; 120; 400; 22; 1.84; 3; 20; 10; 1; 12; 200; 27; 25; 2 TABLET ORAL at 08:46

## 2022-12-31 RX ADMIN — SERTRALINE 200 MG: 100 TABLET, FILM COATED ORAL at 08:45

## 2022-12-31 RX ADMIN — ACETAMINOPHEN 1000 MG: 500 TABLET ORAL at 15:27

## 2022-12-31 RX ADMIN — KETOROLAC TROMETHAMINE 30 MG: 30 INJECTION, SOLUTION INTRAMUSCULAR; INTRAVENOUS at 08:47

## 2022-12-31 RX ADMIN — KETOROLAC TROMETHAMINE 30 MG: 30 INJECTION, SOLUTION INTRAMUSCULAR; INTRAVENOUS at 22:16

## 2022-12-31 RX ADMIN — DOCUSATE SODIUM 100 MG: 100 CAPSULE, LIQUID FILLED ORAL at 08:47

## 2022-12-31 RX ADMIN — LABETALOL HYDROCHLORIDE 300 MG: 300 TABLET, FILM COATED ORAL at 02:09

## 2022-12-31 RX ADMIN — ACETAMINOPHEN 1000 MG: 500 TABLET ORAL at 08:46

## 2022-12-31 RX ADMIN — FEXOFENADINE HYDROCHLORIDE 180 MG: 60 TABLET, FILM COATED ORAL at 17:18

## 2022-12-31 RX ADMIN — ACETAMINOPHEN 1000 MG: 500 TABLET ORAL at 02:09

## 2022-12-31 ASSESSMENT — PAIN DESCRIPTION - PAIN TYPE
TYPE: SURGICAL PAIN

## 2022-12-31 ASSESSMENT — PATIENT HEALTH QUESTIONNAIRE - PHQ9
SUM OF ALL RESPONSES TO PHQ9 QUESTIONS 1 AND 2: 0
1. LITTLE INTEREST OR PLEASURE IN DOING THINGS: NOT AT ALL

## 2022-12-31 NOTE — LACTATION NOTE
MOB out of room. Pump present. She is a 31 y/o  who delivered a 32 6/7 gestation infant after induction of labor for Gesstational hypertension which was stabilized on labetalol and non-reassuring fetal heart tracing. Infant is in NICU- .

## 2022-12-31 NOTE — ANESTHESIA TIME REPORT
Anesthesia Start and Stop Event Times     Date Time Event    12/30/2022 1833 Ready for Procedure     1916 Anesthesia Start     2039 Anesthesia Stop        Responsible Staff  12/30/22    Name Role Begin End    Albina Lima M.D. Anesth 1916 2039        Overtime Reason:  no overtime (within assigned shift)    Comments:

## 2022-12-31 NOTE — LACTATION NOTE
CHARAN is a 31 y/o  who was delivered via c/section due to Severe hypertension with nonreassuring fetal status. MOB in NICU with infant skin to skin. She is projected to be here until Tuesday. She has WIC application in and has to do the phone interview. Teach to call Monday am and then have her   the pump before discharge as arranged; they live in [place].    She has been pumpingevery 3 hours, and getting drops to mls, but feels the flanges are too small; 28.5 mm put in room and Need evaluated tomorrow. Review schedule, settings and cleaning of parts. MOB voices understanding.

## 2022-12-31 NOTE — ANESTHESIA PROCEDURE NOTES
Spinal Block    Date/Time: 12/30/2022 7:23 PM  Performed by: Albina Lima M.D.  Authorized by: Albina Lima M.D.     Start Time:  12/30/2022 7:23 PM  End Time:  12/30/2022 7:00 PM  Reason for Block: primary anesthetic    patient identified, IV checked, site marked, risks and benefits discussed, surgical consent, monitors and equipment checked, pre-op evaluation and timeout performed    Patient Position:  Sitting  Prep: ChloraPrep, patient draped and sterile technique    Monitoring:  Blood pressure, continuous pulse oximetry and heart rate  Approach:  Midline  Location:  L3-4  Injection Technique:  Single-shot  Skin infiltration:  Lidocaine  Strength:  1%  Dose:  3ml  Needle Type:  Pencan  Needle Gauge:  25 G  CSF flowing pre/post injection:  Yes  Sensory Level:  T4

## 2022-12-31 NOTE — PROGRESS NOTES
.Assumed care at 0700  Pt fundus firm. Fundus at umbilicus. Lochia light, rubra  Pt states pain 3/10 to abd.    in nicu  Patient breastfeeding  Dressing to abd c/d/I  Plan  DC aleksey  Pain control  Monitor fundus/lochia

## 2022-12-31 NOTE — PROGRESS NOTES
: Received report from Davina RAPP. CLARISA discussed.     :  of viable baby girl.    : Gave report to Sg MCKENNA discussed.

## 2022-12-31 NOTE — OP REPORT
DATE OF SERVICE:  2022     PREPROCEDURE DIAGNOSES:  1.  Intrauterine pregnancy 32 weeks 6 days.  2.  Recurring late decelerations.  3.  Gestational hypertension.  4.  Intracranial hypertension.     POSTOPERATIVE DIAGNOSES:  1.  Intrauterine pregnancy 32 weeks 6 days.  2.  Recurring late decelerations.  3.  Gestational hypertension.  4.  Intracranial hypertension.     PROCEDURE:  Primary low transverse  section.     SURGEON:  Rey Jay MD     ASSISTANT:  Raina Chowdary MD     ANESTHESIA:  Spinal.     ANESTHESIOLOGIST:  Albina Lima MD     OPERATIVE FINDINGS:  Liveborn female, Apgars 4, 7 and 9.  Weight pending.    Normal ovaries and fallopian tubes.     DESCRIPTION OF PROCEDURE:  After induction of spinal anesthetic, the patient   was placed in supine position, left lateral tilt, prepped and draped in the   usual manner for  section.  Pfannenstiel skin incision was made,   carrying incision through skin and subcutaneous tissue into the rectus fascia.    The fascial incision was extended laterally with curved Arora scissors.    Fascia was  from the muscle superiorly and inferiorly with sharp and   blunt dissection.  Rectus muscle split in the midline.  Perineum was entered   bluntly and extended.  The Tremaine retractor was then placed and the low   transverse uterine incision was made until intrauterine cavity obtaining a   bulging bag.  The incision was extended to superior and inferior pole.  Bag of   cheema was ruptured, clear fluid and the infant was then delivered easily   through the incision with a nuchal cord that was loose.  Infant was a liveborn   female, Apgars 4, 7 and 9.  Weight 1740 gm.  Placenta was delivered spontaneous   and intact with 3 vessels after cord blood samples have been obtained.    Uterine cavity wiped dry and the uterus was then closed with running   interlocking suture of 0 chromic.  An imbricating suture was then used of 0   chromic to close  the myometrial surfaces.  This was adequate for hemostasis   and approximation of surfaces.     Both ovaries and fallopian tubes were identified and found to be normal.  All   laparotomy tapes were removed.  Tremaine retractor was removed.  Sponge and   needle counts were then closed.  The counts were normal and correct.  Perineum   was then closed with a running suture of 3-0 Vicryl.  Subfascial tissues   inspected.  Bleeding points identified and coagulated.  Rectus muscle   reapproximated with #0 chromic.  Fascia closed with 2 sutures of 0 Vicryl.    Subcutaneous tissue irrigated.  A 3-0 Vicryl interrupted sutures were used for   approximation.  A 4-0 Monocryl was used for subcuticular closure.  Wound   Steri-Stripped.  Prevena wound VAC applied.  The patient tolerated the   procedure well.   mL.  Vagina had minimal bleeding with blood.  The   patient's blood type is O positive.  The patient returned to recovery in   satisfactory condition.  She received 3 grams Ancef preoperatively.        ______________________________  MD DIEGO MAI/HUEY    DD:  12/30/2022 21:04  DT:  12/30/2022 21:44    Job#:  [id number]

## 2022-12-31 NOTE — OR SURGEON
Immediate Post OP Note    PreOp Diagnosis: IUP 32 6/7                                Recurring late decelerations.                                Gestational hypertension                                 Intracranial hypertension      PostOp Diagnosis: Same      Procedure(s):   SECTION, PRIMARY LOW TRANSVERSE- Wound Class: Clean Contaminated    Surgeon(s):  STEW Neely M.D.    Anesthesiologist/Type of Anesthesia:  Anesthesiologist: Albina Lima M.D./Spinal    Surgical Staff:  Circulator: Yuko Torrez R.N.  Scrub Person: Mey Prince    Specimens removed if any:  ID Type Source Tests Collected by Time Destination   A :  Tissue Placenta PATHOLOGY SPECIMEN Rey Jay M.D. 2022  8:26 PM        Estimated Blood Loss: 600 ml    Findings: Liveborn female Apgar 4, 7, 9.  Normal ovaries and tubes.    Complications: None        2022 8:48 PM Rey Jay M.D.

## 2022-12-31 NOTE — CARE PLAN
The patient is Stable - Low risk of patient condition declining or worsening    Shift Goals  Clinical Goals: VSS, Pain control    Progress made toward(s) clinical / shift goals:    Plan of care reviewed with pt, all questions and concerns addressed. Call light within reach, bed in lowest and locked position.    Problem: Knowledge Deficit - Postpartum  Goal: Patient will verbalize and demonstrate understanding of self and infant care  Outcome: Progressing     Problem: Psychosocial - Postpartum  Goal: Patient will verbalize and demonstrate effective bonding and parenting behavior  Outcome: Progressing     Problem: Altered Physiologic Condition  Goal: Patient physiologically stable as evidenced by normal lochia, palpable uterine involution and vitals within normal limits  Outcome: Progressing     Problem: Infection - Postpartum  Goal: Postpartum patient will be free of signs and symptoms of infection  Outcome: Progressing     Problem: Respiratory/Oxygenation Function Post-Surgical  Goal: Patient will achieve/maintain normal respiratory rate/effort  Outcome: Progressing     Problem: Bowel Elimination - Post Surgical  Goal: Patient will resume regular bowel sounds and function with no discomfort or distention  Outcome: Progressing     Problem: Early Mobilization - Post Surgery  Goal: Early mobilization post surgery  Outcome: Progressing

## 2022-12-31 NOTE — ANESTHESIA POSTPROCEDURE EVALUATION
Patient: Flaca Ruiz    Procedure Summary     Date: 22 Room / Location: [facility] OR 02 / SURGERY LABOR AND DELIVERY    Anesthesia Start:  Anesthesia Stop:     Procedure:  SECTION, PRIMARY (Bilateral: Abdomen) Diagnosis:       S/P       (same delivery)    Surgeons: Rey Jay M.D. Responsible Provider: Albina Lima M.D.    Anesthesia Type: spinal ASA Status: 2 - Emergent          Final Anesthesia Type: spinal  Last vitals  BP   141/76   Temp   36.2 °C (97.2 °F)    Pulse   84   Resp   18    SpO2   96 %      Anesthesia Post Evaluation    Patient location during evaluation: PACU  Patient participation: complete - patient participated  Level of consciousness: awake  Pain score: 2    Airway patency: patent  Anesthetic complications: no  Cardiovascular status: hemodynamically stable  Respiratory status: acceptable  Hydration status: acceptable    PONV: none          No notable events documented.     Nurse Pain Score: 7 (NPRS)

## 2022-12-31 NOTE — PROGRESS NOTES
S: Pt still with headaches.  O: Patient Vitals for the past 24 hrs:   BP Temp Temp src Pulse Resp SpO2   22 1621 132/72 36.2 °C (97.1 °F) Temporal 84 12 97 %   22 1232 (!) 140/74 36.2 °C (97.1 °F) Temporal 80 14 97 %   22 1022 139/84 -- -- 82 -- --   22 0855 (!) 143/77 36.1 °C (97 °F) Temporal 84 20 98 %   22 0420 119/62 36.1 °C (96.9 °F) Temporal 78 17 --   22 0020 128/63 36.2 °C (97.2 °F) Temporal 83 17 --   22 2000 (!) 148/75 36.4 °C (97.5 °F) Temporal 86 18 --   22 1830 138/77 -- -- 75 -- --     EFM: Recurring late decelerations.  Contractions every 3-5 minutes.  A: IUP 32 6/7 weeks with nonreassuring fetal status.       Gestational hypertension severe       Intracranial hypertension      Hypothyroidism  P: Proceed with  section at this time.  Pt consented.  Risks of surgery and indication for delivery discussed.    Time: 15 minutes

## 2022-12-31 NOTE — ANESTHESIA PREPROCEDURE EVALUATION
Case: [id number] Date/Time: 22    Procedure:  SECTION, PRIMARY (Abdomen)    Anesthesia type: Spinal    Pre-op diagnosis: 32.6 WEEKS, FETAL INTOLERANCE    Location: [facility] OR 02 / SURGERY [facility] AND DELIVERY    Surgeons: Rey Jay M.D.        31 yo  32+6wk gestation HTN w/fetal intolerance    Relevant Problems   CARDIAC   (positive) Pre-eclampsia affecting pregnancy, antepartum      GI   (positive) Gastroesophageal reflux disease      ENDO   (positive) Hypothyroidism due to Hashimoto's thyroiditis      OB   (positive) Pre-eclampsia affecting pregnancy, antepartum      Other   (positive) Intracranial hypertension   (positive) Persistent depressive disorder     Hx mirgraines    Physical Exam    Airway   Mallampati: II  TM distance: >3 FB  Neck ROM: full       Cardiovascular   Rhythm: regular  Rate: normal  (-) murmur     Dental - normal exam           Pulmonary   Breath sounds clear to auscultation     Abdominal    Neurological - normal exam                 Anesthesia Plan    ASA 3- EMERGENT   ASA physical status 3 criteria: morbid obesity - BMI greater than or equal to 40ASA physical status emergent criteria: non-reassuring fetal heart tones    Plan - spinal   Neuraxial block will be primary anesthetic                Postoperative Plan: Postoperative administration of opioids is intended.    Pertinent diagnostic labs and testing reviewed    Informed Consent:    Anesthetic plan and risks discussed with patient.    Use of blood products discussed with: patient whom consented to blood products.

## 2022-12-31 NOTE — PROGRESS NOTES
Pt is in NICU and declines return to room. She is breast feeding and does not want to be disturbed per MA who went down to see her.    Visited patient in NICU who is sitting in a lounger with baby sleeping on her. Pt states no complaints. Exam not performed secondary to mom with baby time.    Recent Labs     12/31/22  0654   WBC 17.0*   RBC 4.04*   HEMOGLOBIN 11.0*   HEMATOCRIT 34.4*   MCV 85.1   MCH 27.2   RDW 47.7   PLATELETCT 177   MPV 12.9     Vitals:    12/31/22 1000   BP: (!) 147/84   Pulse: 89   Resp: 18   Temp: 36.4 °C (97.6 °F)   SpO2: 96%        Pt is stable

## 2023-01-01 PROCEDURE — 770002 HCHG ROOM/CARE - OB PRIVATE (112)

## 2023-01-01 PROCEDURE — 700102 HCHG RX REV CODE 250 W/ 637 OVERRIDE(OP): Performed by: OBSTETRICS & GYNECOLOGY

## 2023-01-01 PROCEDURE — A9270 NON-COVERED ITEM OR SERVICE: HCPCS | Performed by: OBSTETRICS & GYNECOLOGY

## 2023-01-01 RX ADMIN — DOCUSATE SODIUM 100 MG: 100 CAPSULE, LIQUID FILLED ORAL at 08:23

## 2023-01-01 RX ADMIN — IBUPROFEN 800 MG: 800 TABLET, FILM COATED ORAL at 22:09

## 2023-01-01 RX ADMIN — IBUPROFEN 800 MG: 800 TABLET, FILM COATED ORAL at 05:17

## 2023-01-01 RX ADMIN — ACETAMINOPHEN 1000 MG: 500 TABLET ORAL at 22:22

## 2023-01-01 RX ADMIN — ACETAMINOPHEN 1000 MG: 500 TABLET ORAL at 08:22

## 2023-01-01 RX ADMIN — IBUPROFEN 800 MG: 800 TABLET, FILM COATED ORAL at 12:55

## 2023-01-01 RX ADMIN — LEVOTHYROXINE SODIUM 224 MCG: 0.11 TABLET ORAL at 05:17

## 2023-01-01 RX ADMIN — LABETALOL HYDROCHLORIDE 300 MG: 300 TABLET, FILM COATED ORAL at 18:36

## 2023-01-01 RX ADMIN — LABETALOL HYDROCHLORIDE 300 MG: 300 TABLET, FILM COATED ORAL at 02:38

## 2023-01-01 RX ADMIN — SERTRALINE 200 MG: 100 TABLET, FILM COATED ORAL at 08:22

## 2023-01-01 RX ADMIN — OMEPRAZOLE 20 MG: 20 CAPSULE, DELAYED RELEASE ORAL at 08:23

## 2023-01-01 RX ADMIN — ACETAMINOPHEN 1000 MG: 500 TABLET ORAL at 16:34

## 2023-01-01 RX ADMIN — PRENATAL WITH FERROUS FUM AND FOLIC ACID 1 TABLET: 3080; 920; 120; 400; 22; 1.84; 3; 20; 10; 1; 12; 200; 27; 25; 2 TABLET ORAL at 08:22

## 2023-01-01 RX ADMIN — FEXOFENADINE HYDROCHLORIDE 180 MG: 60 TABLET, FILM COATED ORAL at 18:32

## 2023-01-01 RX ADMIN — LABETALOL HYDROCHLORIDE 300 MG: 300 TABLET, FILM COATED ORAL at 10:31

## 2023-01-01 RX ADMIN — ACETAMINOPHEN 1000 MG: 500 TABLET ORAL at 02:38

## 2023-01-01 RX ADMIN — FEXOFENADINE HYDROCHLORIDE 180 MG: 60 TABLET, FILM COATED ORAL at 08:22

## 2023-01-01 ASSESSMENT — PAIN DESCRIPTION - PAIN TYPE
TYPE: SURGICAL PAIN

## 2023-01-01 NOTE — PROGRESS NOTES
"Flaca Wells Joseph PP day 2 POD 2    Subjective: Abdominal pain. no, ambulating .yes, tolerating liquids .yes, tolerating regular diet .yes, flatus.yes, BM .no, Bleeding .light, voiding .yes,dizziness .no, breast feeding.pumping, breast tenderness .no    BP (!) 153/81   Pulse 80   Temp 36.9 °C (98.5 °F) (Temporal)   Resp 20   Ht 1.651 m (5' 5\")   Wt 122 kg (268 lb 12.8 oz)   SpO2 94%   Breast Exam: Tenderness .no, Engourgement .no, Mastitis .no  Abdomen soft, non-tender. BS normal. No masses,  No organomegaly  Incision:  dressing intact with wound vac  Fundus Tenderness:no, Below umbilicus: yes  Perineumperineum intact  ExtremitiesNormal    Meds:   No current facility-administered medications on file prior to encounter.     Current Outpatient Medications on File Prior to Encounter   Medication Sig Dispense Refill    omeprazole (PRILOSEC) 20 MG delayed-release capsule Take 20 mg by mouth every day.      sumatriptan (IMITREX) 100 MG tablet Take 100 mg by mouth 1 time a day as needed for Migraine.      labetalol (NORMODYNE) 100 MG Tab Take 100 mg by mouth every 8 hours.      fexofenadine (ALLEGRA) 180 MG tablet Take 360 mg by mouth every day. 360 mg = 2 tablets      vitamin D3 (CHOLECALCIFEROL) 400 UNIT Tab Take 1,000 Units by mouth every day.      Albuterol Sulfate 108 (90 Base) MCG/ACT AEROSOL POWDER, BREATH ACTIVATED Inhale 2 Puffs.      sertraline (ZOLOFT) 100 MG Tab Take 100 mg by mouth every day.      levothyroxine (SYNTHROID) 112 MCG Tab Take 224-336 mcg by mouth every morning on an empty stomach. 224mcg 5 days a week  336mcg 2 days a week         Lab:   Recent Results (from the past 48 hour(s))   CBC without differential- Once in AM regardless of delivery time    Collection Time: 12/31/22  6:54 AM   Result Value Ref Range    WBC 17.0 (H) 4.8 - 10.8 K/uL    RBC 4.04 (L) 4.20 - 5.40 M/uL    Hemoglobin 11.0 (L) 12.0 - 16.0 g/dL    Hematocrit 34.4 (L) 37.0 - 47.0 %    MCV 85.1 81.4 - 97.8 fL    MCH 27.2 27.0 " - 33.0 pg    MCHC 32.0 (L) 33.6 - 35.0 g/dL    RDW 47.7 35.9 - 50.0 fL    Platelet Count 177 164 - 446 K/uL    MPV 12.9 9.0 - 12.9 fL       Assessment and Plan  Course complicated by C/S at 32 weeks  No heavy bleeding or foul vaginal discharge     Continue Routine postpartum care  Ambulate  Baby in NICU

## 2023-01-01 NOTE — CARE PLAN
The patient is Stable - Low risk of patient condition declining or worsening    Shift Goals  Clinical Goals: monitor BP, stable vs, lochia wdl, and pain control  Patient Goals: pumping for baby, visit baby in NICU, and pain control  Family Goals: update on poc and support    Progress made toward(s) clinical / shift goals:  POC/medications discussed with pt. Pt reported of burning urination while voiding and pt producing boni urine. Pt reported of being able to empty bladder. Encouraged hydration and spray given. MOB visits frequently to feed baby in NICU. Lochia WDL. Perineum is edematous and ice provided. Encouraged ambulation as tolerated and currently monitoring BP.      Problem: Knowledge Deficit - Postpartum  Goal: Patient will verbalize and demonstrate understanding of self and infant care  Outcome: Progressing     Problem: Psychosocial - Postpartum  Goal: Patient will verbalize and demonstrate effective bonding and parenting behavior  Outcome: Progressing     Problem: Altered Physiologic Condition  Goal: Patient physiologically stable as evidenced by normal lochia, palpable uterine involution and vitals within normal limits  Outcome: Progressing     Problem: Infection - Postpartum  Goal: Postpartum patient will be free of signs and symptoms of infection  Outcome: Progressing     Problem: Bowel Elimination - Post Surgical  Goal: Patient will resume regular bowel sounds and function with no discomfort or distention  Outcome: Progressing     Problem: Early Mobilization - Post Surgery  Goal: Early mobilization post surgery  Outcome: Progressing     Problem: Hemodynamics  Goal: Patient's hemodynamics, fluid balance and neurologic status will be stable or improve  Outcome: Progressing

## 2023-01-01 NOTE — CARE PLAN
Problem: Pain - Standard  Goal: Alleviation of pain or a reduction in pain to the patient’s comfort goal  Outcome: Progressing  Note: Pain managed with current pain regimen.      Problem: Altered Physiologic Condition  Goal: Patient physiologically stable as evidenced by normal lochia, palpable uterine involution and vitals within normal limits  Outcome: Progressing  Note: Fundus firm with lochia light rubra.    The patient is Stable - Low risk of patient condition declining or worsening    Shift Goals  Clinical Goals: VSS, Pain control  Patient Goals: stay pregnant a little longer  Family Goals: None present    Progress made toward(s) clinical / shift goals:       Patient is not progressing towards the following goals:

## 2023-01-01 NOTE — CARE PLAN
The patient is Stable - Low risk of patient condition declining or worsening    Shift Goals  Clinical Goals:  (stable vitals, pain control)  Patient Goals: pumping for baby, visit baby in NICU, and pain control  Family Goals: update on poc and support      Problem: Altered Physiologic Condition  Goal: Patient physiologically stable as evidenced by normal lochia, palpable uterine involution and vitals within normal limits  Outcome: Progressing  Note: Fundus firm, lochia scant.     Problem: Infection - Postpartum  Goal: Postpartum patient will be free of signs and symptoms of infection  Outcome: Progressing  Note: No signs or symptoms of infection noted or reported. Vital signs stable.

## 2023-01-01 NOTE — PROGRESS NOTES
Assumed care. Assessment completed. Fundus firm, lochia scant rubra. Abdominal incision with wound vac dressing intact. Plan of Care reviewed.

## 2023-01-01 NOTE — LACTATION NOTE
This note was copied from a baby's chart.  Met with mother for a lactation consultation. MOB has been pumping her breasts to provide for her baby in NICU. 25mm flanges fit too large. Provided 22.5mm inserts and fit is appropriate. MOB reports increase in comfort. Pumping for a total of 15 min every 2-3hrs, 80cpm down to 60cpm after 2 min, suction to comfort at 20%.

## 2023-01-02 PROBLEM — D62 ACUTE BLOOD LOSS ANEMIA: Status: ACTIVE | Noted: 2023-01-02

## 2023-01-02 LAB
ALBUMIN SERPL BCP-MCNC: 3.1 G/DL (ref 3.2–4.9)
ALBUMIN/GLOB SERPL: 1.3 G/DL
ALP SERPL-CCNC: 98 U/L (ref 30–99)
ALT SERPL-CCNC: 11 U/L (ref 2–50)
ANION GAP SERPL CALC-SCNC: 14 MMOL/L (ref 7–16)
AST SERPL-CCNC: 13 U/L (ref 12–45)
BILIRUB SERPL-MCNC: <0.2 MG/DL (ref 0.1–1.5)
BUN SERPL-MCNC: 10 MG/DL (ref 8–22)
CALCIUM ALBUM COR SERPL-MCNC: 9.3 MG/DL (ref 8.5–10.5)
CALCIUM SERPL-MCNC: 8.6 MG/DL (ref 8.5–10.5)
CHLORIDE SERPL-SCNC: 104 MMOL/L (ref 96–112)
CO2 SERPL-SCNC: 20 MMOL/L (ref 20–33)
CREAT SERPL-MCNC: 0.82 MG/DL (ref 0.5–1.4)
ERYTHROCYTE [DISTWIDTH] IN BLOOD BY AUTOMATED COUNT: 50.4 FL (ref 35.9–50)
GFR SERPLBLD CREATININE-BSD FMLA CKD-EPI: 97 ML/MIN/1.73 M 2
GLOBULIN SER CALC-MCNC: 2.4 G/DL (ref 1.9–3.5)
GLUCOSE SERPL-MCNC: 139 MG/DL (ref 65–99)
HCT VFR BLD AUTO: 34.4 % (ref 37–47)
HGB BLD-MCNC: 10.8 G/DL (ref 12–16)
MCH RBC QN AUTO: 27.5 PG (ref 27–33)
MCHC RBC AUTO-ENTMCNC: 31.4 G/DL (ref 33.6–35)
MCV RBC AUTO: 87.5 FL (ref 81.4–97.8)
PATHOLOGY CONSULT NOTE: NORMAL
PLATELET # BLD AUTO: 156 K/UL (ref 164–446)
PMV BLD AUTO: 12.3 FL (ref 9–12.9)
POTASSIUM SERPL-SCNC: 3.7 MMOL/L (ref 3.6–5.5)
PROT SERPL-MCNC: 5.5 G/DL (ref 6–8.2)
RBC # BLD AUTO: 3.93 M/UL (ref 4.2–5.4)
SODIUM SERPL-SCNC: 138 MMOL/L (ref 135–145)
WBC # BLD AUTO: 10.7 K/UL (ref 4.8–10.8)

## 2023-01-02 PROCEDURE — 90686 IIV4 VACC NO PRSV 0.5 ML IM: CPT | Performed by: OBSTETRICS & GYNECOLOGY

## 2023-01-02 PROCEDURE — A9270 NON-COVERED ITEM OR SERVICE: HCPCS | Performed by: OBSTETRICS & GYNECOLOGY

## 2023-01-02 PROCEDURE — 80053 COMPREHEN METABOLIC PANEL: CPT

## 2023-01-02 PROCEDURE — 700111 HCHG RX REV CODE 636 W/ 250 OVERRIDE (IP): Performed by: OBSTETRICS & GYNECOLOGY

## 2023-01-02 PROCEDURE — 36415 COLL VENOUS BLD VENIPUNCTURE: CPT

## 2023-01-02 PROCEDURE — 90471 IMMUNIZATION ADMIN: CPT

## 2023-01-02 PROCEDURE — 700102 HCHG RX REV CODE 250 W/ 637 OVERRIDE(OP): Performed by: OBSTETRICS & GYNECOLOGY

## 2023-01-02 PROCEDURE — 85027 COMPLETE CBC AUTOMATED: CPT

## 2023-01-02 PROCEDURE — 3E02340 INTRODUCTION OF INFLUENZA VACCINE INTO MUSCLE, PERCUTANEOUS APPROACH: ICD-10-PCS | Performed by: OBSTETRICS & GYNECOLOGY

## 2023-01-02 PROCEDURE — 770002 HCHG ROOM/CARE - OB PRIVATE (112)

## 2023-01-02 RX ORDER — FERROUS GLUCONATE 324(38)MG
324 TABLET ORAL
Status: DISCONTINUED | OUTPATIENT
Start: 2023-01-03 | End: 2023-01-03 | Stop reason: HOSPADM

## 2023-01-02 RX ADMIN — IBUPROFEN 800 MG: 800 TABLET, FILM COATED ORAL at 15:23

## 2023-01-02 RX ADMIN — LEVOTHYROXINE SODIUM 224 MCG: 0.11 TABLET ORAL at 06:01

## 2023-01-02 RX ADMIN — INFLUENZA A VIRUS A/VICTORIA/2570/2019 IVR-215 (H1N1) ANTIGEN (FORMALDEHYDE INACTIVATED), INFLUENZA A VIRUS A/DARWIN/9/2021 SAN-010 (H3N2) ANTIGEN (FORMALDEHYDE INACTIVATED), INFLUENZA B VIRUS B/PHUKET/3073/2013 ANTIGEN (FORMALDEHYDE INACTIVATED), AND INFLUENZA B VIRUS B/MICHIGAN/01/2021 ANTIGEN (FORMALDEHYDE INACTIVATED) 0.5 ML: 15; 15; 15; 15 INJECTION, SUSPENSION INTRAMUSCULAR at 15:24

## 2023-01-02 RX ADMIN — FEXOFENADINE HYDROCHLORIDE 180 MG: 60 TABLET, FILM COATED ORAL at 08:00

## 2023-01-02 RX ADMIN — SERTRALINE 200 MG: 100 TABLET, FILM COATED ORAL at 08:00

## 2023-01-02 RX ADMIN — IBUPROFEN 800 MG: 800 TABLET, FILM COATED ORAL at 23:04

## 2023-01-02 RX ADMIN — ACETAMINOPHEN 1000 MG: 500 TABLET ORAL at 23:04

## 2023-01-02 RX ADMIN — LABETALOL HYDROCHLORIDE 300 MG: 300 TABLET, FILM COATED ORAL at 17:33

## 2023-01-02 RX ADMIN — IBUPROFEN 800 MG: 800 TABLET, FILM COATED ORAL at 05:02

## 2023-01-02 RX ADMIN — ACETAMINOPHEN 1000 MG: 500 TABLET ORAL at 05:02

## 2023-01-02 RX ADMIN — OMEPRAZOLE 20 MG: 20 CAPSULE, DELAYED RELEASE ORAL at 08:00

## 2023-01-02 RX ADMIN — PRENATAL WITH FERROUS FUM AND FOLIC ACID 1 TABLET: 3080; 920; 120; 400; 22; 1.84; 3; 20; 10; 1; 12; 200; 27; 25; 2 TABLET ORAL at 08:00

## 2023-01-02 RX ADMIN — DOCUSATE SODIUM 100 MG: 100 CAPSULE, LIQUID FILLED ORAL at 08:00

## 2023-01-02 RX ADMIN — ACETAMINOPHEN 1000 MG: 500 TABLET ORAL at 17:32

## 2023-01-02 RX ADMIN — FEXOFENADINE HYDROCHLORIDE 180 MG: 60 TABLET, FILM COATED ORAL at 17:33

## 2023-01-02 RX ADMIN — LABETALOL HYDROCHLORIDE 300 MG: 300 TABLET, FILM COATED ORAL at 11:51

## 2023-01-02 RX ADMIN — ACETAMINOPHEN 1000 MG: 500 TABLET ORAL at 11:52

## 2023-01-02 RX ADMIN — LABETALOL HYDROCHLORIDE 300 MG: 300 TABLET, FILM COATED ORAL at 02:48

## 2023-01-02 ASSESSMENT — EDINBURGH POSTNATAL DEPRESSION SCALE (EPDS)
I HAVE FELT SCARED OR PANICKY FOR NO GOOD REASON: NO, NOT MUCH
I HAVE BEEN ABLE TO LAUGH AND SEE THE FUNNY SIDE OF THINGS: AS MUCH AS I ALWAYS COULD
I HAVE BLAMED MYSELF UNNECESSARILY WHEN THINGS WENT WRONG: YES, SOME OF THE TIME
THINGS HAVE BEEN GETTING ON TOP OF ME: NO, MOST OF THE TIME I HAVE COPED QUITE WELL
I HAVE BEEN ANXIOUS OR WORRIED FOR NO GOOD REASON: HARDLY EVER
I HAVE FELT SAD OR MISERABLE: NO, NOT AT ALL
I HAVE LOOKED FORWARD WITH ENJOYMENT TO THINGS: AS MUCH AS I EVER DID
I HAVE BEEN SO UNHAPPY THAT I HAVE HAD DIFFICULTY SLEEPING: NOT AT ALL
THE THOUGHT OF HARMING MYSELF HAS OCCURRED TO ME: NEVER
I HAVE BEEN SO UNHAPPY THAT I HAVE BEEN CRYING: NO, NEVER

## 2023-01-02 ASSESSMENT — PAIN DESCRIPTION - PAIN TYPE
TYPE: SURGICAL PAIN

## 2023-01-02 NOTE — LACTATION NOTE
This note was copied from a baby's chart.  Mom using 22.5mm flange inserts.  Good fit and mom states that they are much more comfortable.  Reviewed pump settings, cleaning instructions and frequency.  Mom denies any needs or questions at this time.

## 2023-01-02 NOTE — CARE PLAN
Problem: Infection - Postpartum  Goal: Postpartum patient will be free of signs and symptoms of infection  Outcome: Progressing   The patient is Stable - Low risk of patient condition declining or worsening    Shift Goals  Clinical Goals: Vital signs WNL, pain control, fundus firm, lochia WNL  Patient Goals: pumping for baby, visit baby in NICU, and pain control  Family Goals: update on poc and support    Progress made toward(s) clinical / shift goals:  vs and assessment wnl    Patient is not progressing towards the following goals:

## 2023-01-02 NOTE — DISCHARGE PLANNING
Discharge Planning Assessment Post Partum    Reason for Referral: NICU  Address: [address] [place] [facility]   Type of Living Situation:Stable  Mom Diagnosis: Postpartum  Baby Diagnosis: NICU 33.2   Primary Language: English     Name of Baby: Jeramie Ruiz  Father of the Baby: Sloan Ruiz  Involved in baby’s care? Yes  Contact Information: [phone]    Prenatal Care: Yes  Mom's PCP: [place]  PCP for new baby:Pediatrician list provided    Support System: Yes  Coping/Bonding between mother & baby: MOB coping/bonding   Source of Feeding: Breast  Supplies for Infant: Yes    Mom's Insurance: Medicaid MOBs ID [id number]  Baby Covered on Insurance:Yes  Mother Employed/School: None  Other children in the home/names & ages: First     Financial Hardship/Income: Yes. Resources provided    Mom's Mental status: Stable and alert  Services used prior to admit: None    CPS History: None  Psychiatric History: Hx of depression. Currently on Zoloft. PPD resources provided  Domestic Violence History: None  Drug/ETOH History: None    Resources Provided:  provided postpartum depression resources, pediatrician list and community resources   Referrals Made: None     Clearance for Discharge: Baby is cleared to discharge with MOB and FOB when medically cleared      Ongoing Plan: will continue to follow and provide support while in NICU

## 2023-01-02 NOTE — CARE PLAN
The patient is Stable - Low risk of patient condition declining or worsening    Shift Goals  Clinical Goals: Vital signs WNL, pain control, fundus firm, lochia WNL  Patient Goals: pumping for baby, visit baby in NICU, and pain control  Family Goals: update on poc and support    Progress made toward(s) clinical / shift goals:    Problem: Altered Physiologic Condition  Goal: Patient physiologically stable as evidenced by normal lochia, palpable uterine involution and vitals within normal limits  Outcome: Progressing  Note: Patient is physiologically stable as evidenced by normal lochia, firm fundus.      Problem: Infection - Postpartum  Goal: Postpartum patient will be free of signs and symptoms of infection  Outcome: Progressing  Note: Patient is free of signs/symptoms of infection.  Temperature is WNL.        Patient is not progressing towards the following goals:

## 2023-01-02 NOTE — PROGRESS NOTES
1915 Assumed care of patient at change of shift.  Patient sleeping.    2100 Patient sleeping comfortably,, unlabored breathing.     2145 Assessment completed.  Fundus firm, lochia light.  Abdominal incision with prevena wound vac and dressing intact. Plan of care reviewed with patient and she voiced understanding.  Patient is planning to pump at 0030 and visit infant in NICU at 0100.    2240 Notified Dr. Jay of patient's /85.  Patient currently in 6/10 pain and worried about providing breastmilk for infant in NICU.  Patient up cleaning breastpump parts prior to blood pressure reading.  This RN to order morning CBC without differential and a CMP and retake patient's BP in 15 minutes and notify MD of results.      2308 Notified Dr. Jay of patient's /80. No new orders at this time.      0130 This RN transported pt to NICU via wheelchair.    0245  This RN transported pt back to unit via wheelchair.

## 2023-01-02 NOTE — PROGRESS NOTES
Stable post op.  Pleasant.  Assessment done.  Progressing well.  States normal bm since delivery.  POC discussed for day.  Verbalizes understanding.  Will continue to monitor.

## 2023-01-02 NOTE — PROGRESS NOTES
"Flaca Ruiz PP day 3 POD 3    Subjective: Abdominal pain. no, ambulating .yes, tolerating liquids .yes, tolerating regular diet .yes, flatus.yes, Bleeding .minimal, voiding .yes,dizziness .no, breast feeding.pumping, breast tenderness .no    /75   Pulse 78   Temp 36.6 °C (97.8 °F) (Temporal)   Resp 18   Ht 1.651 m (5' 5\")   Wt 122 kg (268 lb 12.8 oz)   SpO2 97%   Breast Exam: Tenderness .no, Engourgement .no, Mastitis .no  Abdomen soft, non-tender. BS normal. No masses,  No organomegaly  Incision: dry and intact  Fundus Tenderness:no, Below umbilicus:Yes, firm  Perineumperineum intact  ExtremitiesNormal    Meds:   No current facility-administered medications on file prior to encounter.     Current Outpatient Medications on File Prior to Encounter   Medication Sig Dispense Refill    omeprazole (PRILOSEC) 20 MG delayed-release capsule Take 20 mg by mouth every day.      sumatriptan (IMITREX) 100 MG tablet Take 100 mg by mouth 1 time a day as needed for Migraine.      labetalol (NORMODYNE) 100 MG Tab Take 100 mg by mouth every 8 hours.      fexofenadine (ALLEGRA) 180 MG tablet Take 360 mg by mouth every day. 360 mg = 2 tablets      vitamin D3 (CHOLECALCIFEROL) 400 UNIT Tab Take 1,000 Units by mouth every day.      Albuterol Sulfate 108 (90 Base) MCG/ACT AEROSOL POWDER, BREATH ACTIVATED Inhale 2 Puffs.      sertraline (ZOLOFT) 100 MG Tab Take 100 mg by mouth every day.      levothyroxine (SYNTHROID) 112 MCG Tab Take 224-336 mcg by mouth every morning on an empty stomach. 224mcg 5 days a week  336mcg 2 days a week         Lab:   Recent Results (from the past 48 hour(s))   CBC WITHOUT DIFFERENTIAL    Collection Time: 01/02/23  2:56 AM   Result Value Ref Range    WBC 10.7 4.8 - 10.8 K/uL    RBC 3.93 (L) 4.20 - 5.40 M/uL    Hemoglobin 10.8 (L) 12.0 - 16.0 g/dL    Hematocrit 34.4 (L) 37.0 - 47.0 %    MCV 87.5 81.4 - 97.8 fL    MCH 27.5 27.0 - 33.0 pg    MCHC 31.4 (L) 33.6 - 35.0 g/dL    RDW 50.4 (H) 35.9 - " 50.0 fL    Platelet Count 156 (L) 164 - 446 K/uL    MPV 12.3 9.0 - 12.9 fL   Comp Metabolic Panel    Collection Time: 01/02/23  2:56 AM   Result Value Ref Range    Sodium 138 135 - 145 mmol/L    Potassium 3.7 3.6 - 5.5 mmol/L    Chloride 104 96 - 112 mmol/L    Co2 20 20 - 33 mmol/L    Anion Gap 14.0 7.0 - 16.0    Glucose 139 (H) 65 - 99 mg/dL    Bun 10 8 - 22 mg/dL    Creatinine 0.82 0.50 - 1.40 mg/dL    Calcium 8.6 8.5 - 10.5 mg/dL    AST(SGOT) 13 12 - 45 U/L    ALT(SGPT) 11 2 - 50 U/L    Alkaline Phosphatase 98 30 - 99 U/L    Total Bilirubin <0.2 0.1 - 1.5 mg/dL    Albumin 3.1 (L) 3.2 - 4.9 g/dL    Total Protein 5.5 (L) 6.0 - 8.2 g/dL    Globulin 2.4 1.9 - 3.5 g/dL    A-G Ratio 1.3 g/dL   CORRECTED CALCIUM    Collection Time: 01/02/23  2:56 AM   Result Value Ref Range    Correct Calcium 9.3 8.5 - 10.5 mg/dL   ESTIMATED GFR    Collection Time: 01/02/23  2:56 AM   Result Value Ref Range    GFR (CKD-EPI) 97 >60 mL/min/1.73 m 2       Assessment and Plan  normal postpartum course  No heavy bleeding or foul vaginal discharge     Continue Routine postpartum care  Ambulate  Baby in NICU

## 2023-01-03 VITALS
BODY MASS INDEX: 44.79 KG/M2 | WEIGHT: 268.8 LBS | HEIGHT: 65 IN | RESPIRATION RATE: 18 BRPM | OXYGEN SATURATION: 97 % | TEMPERATURE: 97.2 F | SYSTOLIC BLOOD PRESSURE: 139 MMHG | HEART RATE: 94 BPM | DIASTOLIC BLOOD PRESSURE: 94 MMHG

## 2023-01-03 PROBLEM — O13.9 GESTATIONAL HYPERTENSION: Status: ACTIVE | Noted: 2023-01-03

## 2023-01-03 PROCEDURE — 700102 HCHG RX REV CODE 250 W/ 637 OVERRIDE(OP): Performed by: OBSTETRICS & GYNECOLOGY

## 2023-01-03 PROCEDURE — A9270 NON-COVERED ITEM OR SERVICE: HCPCS | Performed by: OBSTETRICS & GYNECOLOGY

## 2023-01-03 RX ORDER — LABETALOL 300 MG/1
300 TABLET, FILM COATED ORAL EVERY 8 HOURS
Qty: 60 TABLET | Refills: 0 | Status: SHIPPED | OUTPATIENT
Start: 2023-01-03

## 2023-01-03 RX ORDER — FERROUS GLUCONATE 324(38)MG
324 TABLET ORAL
Qty: 30 TABLET | Refills: 0 | Status: SHIPPED | OUTPATIENT
Start: 2023-01-03

## 2023-01-03 RX ORDER — IBUPROFEN 800 MG/1
800 TABLET ORAL EVERY 8 HOURS
Qty: 20 TABLET | Refills: 0 | Status: SHIPPED | OUTPATIENT
Start: 2023-01-03

## 2023-01-03 RX ADMIN — DOCUSATE SODIUM 100 MG: 100 CAPSULE, LIQUID FILLED ORAL at 09:48

## 2023-01-03 RX ADMIN — ACETAMINOPHEN 1000 MG: 500 TABLET ORAL at 06:00

## 2023-01-03 RX ADMIN — FEXOFENADINE HYDROCHLORIDE 180 MG: 60 TABLET, FILM COATED ORAL at 09:48

## 2023-01-03 RX ADMIN — SERTRALINE 200 MG: 100 TABLET, FILM COATED ORAL at 09:07

## 2023-01-03 RX ADMIN — LEVOTHYROXINE SODIUM 224 MCG: 0.11 TABLET ORAL at 06:00

## 2023-01-03 RX ADMIN — LABETALOL HYDROCHLORIDE 300 MG: 300 TABLET, FILM COATED ORAL at 02:26

## 2023-01-03 RX ADMIN — LABETALOL HYDROCHLORIDE 300 MG: 300 TABLET, FILM COATED ORAL at 09:48

## 2023-01-03 RX ADMIN — FERROUS GLUCONATE 324 MG: 324 TABLET ORAL at 09:08

## 2023-01-03 RX ADMIN — IBUPROFEN 800 MG: 800 TABLET, FILM COATED ORAL at 09:08

## 2023-01-03 RX ADMIN — OMEPRAZOLE 20 MG: 20 CAPSULE, DELAYED RELEASE ORAL at 09:48

## 2023-01-03 RX ADMIN — ACETAMINOPHEN 1000 MG: 500 TABLET ORAL at 12:05

## 2023-01-03 RX ADMIN — PRENATAL WITH FERROUS FUM AND FOLIC ACID 1 TABLET: 3080; 920; 120; 400; 22; 1.84; 3; 20; 10; 1; 12; 200; 27; 25; 2 TABLET ORAL at 09:08

## 2023-01-03 ASSESSMENT — PAIN DESCRIPTION - PAIN TYPE
TYPE: SURGICAL PAIN

## 2023-01-03 NOTE — PROGRESS NOTES
Pt assessment complete, poc and rounding discussed. Pt denies the need for pain medication at this time. Electric breast pump reviewed. Questions answered.

## 2023-01-03 NOTE — LACTATION NOTE
This note was copied from a baby's chart.  Mom is a 31 y/o P1 who delivered baby girl at 32.6 wks who is being cared for in the NICU. Mom has been pumping for baby  and is harvesting about 30 mls. LC gave mom the CDC guidelines for collection and storage. Mom is unsure if she is being discharged to day.  LC reviewed their role in the NICU while baby remains admitted in the NICU.  LC gave rental information, however she does have a pump at home.  Mom has no concerns or questions for

## 2023-01-03 NOTE — PROGRESS NOTES
0700- report received from NOC RN. POC discussed including pumping intervals, lochia changes, ambulation, VS intervals, and discharge planning. Medications and other comfort measures discussed. Call light in reach. Baby in NICU.

## 2023-01-03 NOTE — PROGRESS NOTES
1310- Discharge education provided and signed. All printed topics discussed. Emphasis provided on feeding plan, bleeding, and when to call doctor. follow up appointments discussed. All questions answered. No further needs at this time.

## 2023-01-03 NOTE — DISCHARGE SUMMARY
DATE OF ADMISSION:  2022   DATE OF DISCHARGE:  2023     ADMISSION DIAGNOSES:  1.  Intrauterine pregnancy at 32 weeks and 4 days.  2.  Gestational hypertension with exacerbation.  3.  Intracranial hypertension.  4.  Hypothyroidism.  5.  Anxiety, depression disorder.     DISCHARGE DIAGNOSES:  1.  Intrauterine pregnancy, delivered at 32 weeks and 6 days.  2.  Recurrent late decelerations.  3.  Gestational hypertension.  4.  Intracranial hypertension.  5.  Anxiety disorder.     PROCEDURE RECEIVED:  1.  Primary low transverse  section.  2.  Antihypertensive therapy.     COMPLICATIONS:  Blood loss anemia.     HISTORY OF PRESENT ILLNESS:  This is a 32-year-old  1, now para 1, EDC   [facility] 2023  readmitted at 32 weeks and 4 days for severe hypertension.  The patient   had previously been admitted for hypertension, suspected preeclampsia;   however, did not meet criteria.  The patient was showing unstable blood   pressures and nonreassuring fetal heart rate tracing.     PAST MEDICAL HISTORY:  Significant for hypothyroidism and also anxiety   disorder.  The patient also has intracranial hypertension.     HOSPITAL COURSE:  The patient was admitted and blood pressure was treated with   antihypertensive therapy, we increased her labetalol to 300 mg every 8 hours;   however, on the second day, baby started to demonstrate late decelerations.    She underwent a primary low transverse  section, delivering a live   born female infant, Apgars were 4, 6 and 9, weight 1740 grams.    Postoperatively, blood pressure was somewhat labile; however, was able to be   maintained on her current labetalol dose.  Discharge hematocrit is 34.4%,   hemoglobin 10.8, platelet count 156,000.  Chemistries have been normal.     CONDITION ON DISCHARGE:  Satisfactory.     DISPOSITION:  Discharged to home.     FOLLOW UP:  Follow up with Dr. Briseno in the next 2-3 days for wound VAC   removal as well as blood pressure  check.     DISCHARGE MEDICATIONS:  She will continue with the labetalol 300 mg 3 times a   day and to be titrated downward.     She will also be provided ferrous gluconate 324 mg 1 daily, dispensed 30 and   Motrin 800 mg 1 every 8 hours p.r.n. pain and she will supplement with   Tylenol.     PROGNOSIS:  Guarded.  The patient should consider weight reduction to improve   her intracranial hypertension issues, but also to reduce her risks in any   future pregnancy.     RECOMMENDATIONS:  Repeat H and H in a month.     PATIENT INSTRUCTIONS:  Any fever, increasing pain, wound drainage, she should   notify her physician immediately.  The patient's blood type is O positive.        ______________________________  MD DIEGO MAI/DAYLIN/DHIRAJ    DD:  01/03/2023 07:41  DT:  01/03/2023 08:15    Job#:  [id number]

## 2023-01-03 NOTE — DISCHARGE INSTRUCTIONS

## 2023-01-03 NOTE — PROGRESS NOTES
S: No complaints.  O:Patient Vitals for the past 24 hrs:   BP Temp Temp src Pulse Resp SpO2   01/03/23 0600 (!) 144/83 36.6 °C (97.9 °F) Temporal 75 18 94 %   01/03/23 0200 128/69 36.4 °C (97.6 °F) Temporal 79 19 96 %   01/02/23 2200 137/81 36.4 °C (97.6 °F) Temporal 78 18 96 %   01/02/23 1800 (!) 145/76 37 °C (98.6 °F) Temporal 84 18 95 %   01/02/23 1500 (!) 151/75 36.8 °C (98.3 °F) Temporal 84 18 97 %   01/02/23 1200 (!) 140/81 36.8 °C (98.2 °F) Temporal 78 17 97 %   01/02/23 1000 (!) 149/78 36.3 °C (97.3 °F) Temporal 81 18 95 %    Wound dressing intact   Edema: 1+  A: POD 4      Gestational hypertension       Intracranial hypertension  P: Discharge to day with follow up with Dr. Briseno    Time: 15 minutes.

## 2023-07-11 NOTE — PROGRESS NOTES
Chief Complaint:    Chief Complaint   Patient presents with   • Other     pain and bleeding from belly button       History of Present Illness:    This is a new problem. Since she had endometriosis procedure done 2 years ago, with entry thru umbilicus, she has had intermittent bleeding from umbilicus. She has always been able to manage this, but starting last night, the area became tender. She has never had pain or tenderness in the umbilicus until last night. She had diarrhea over past 3-4 days, but she attributed it to taking Diamox more regularly. She stopped the Diamox and today she has not had diarrhea. No fever, chills, nausea, vomiting, or urine symptoms. She has tolerated Doxycycline in the past for other problem.      Review of Systems:    Constitutional: Negative for fever, chills, and diaphoresis.   Eyes: Negative for change in vision, photophobia, pain, redness, and discharge.  ENT: Negative for ear pain, ear discharge, hearing loss, tinnitus, nasal congestion, nosebleeds, and sore throat.    Respiratory: Negative for cough, hemoptysis, sputum production, shortness of breath, wheezing, and stridor.    Cardiovascular: Negative for chest pain, palpitations, orthopnea, claudication, leg swelling, and PND.   Gastrointestinal: See HPI.   Genitourinary: Negative for dysuria, urinary urgency, urinary frequency, hematuria, and flank pain.   Musculoskeletal: Negative for myalgias, joint pain, neck pain, and back pain.   Skin: Negative for rash and itching.   Neurological: Negative for dizziness, tingling, tremors, sensory change, speech change, focal weakness, seizures, loss of consciousness, and headaches.   Endo: Negative for polydipsia.   Heme: Does not bruise/bleed easily.   Psychiatric/Behavioral: No new symptoms.      Past Medical History:    No past medical history on file.    Past Surgical History:    No past surgical history on file.    Social History:    Social History     Socioeconomic History   •  Detail Level: Detailed Add 07536 Cpt? (Important Note: In 2017 The Use Of 93058 Is Being Tracked By Cms To Determine Future Global Period Reimbursement For Global Periods): no Marital status:      Spouse name: Not on file   • Number of children: Not on file   • Years of education: Not on file   • Highest education level: Not on file   Occupational History   • Not on file   Tobacco Use   • Smoking status: Never Smoker   • Smokeless tobacco: Never Used   Substance and Sexual Activity   • Alcohol use: Not Currently   • Drug use: Not Currently   • Sexual activity: Not on file   Other Topics Concern   • Not on file   Social History Narrative   • Not on file     Social Determinants of Health     Financial Resource Strain:    • Difficulty of Paying Living Expenses:    Food Insecurity:    • Worried About Running Out of Food in the Last Year:    • Ran Out of Food in the Last Year:    Transportation Needs:    • Lack of Transportation (Medical):    • Lack of Transportation (Non-Medical):    Physical Activity:    • Days of Exercise per Week:    • Minutes of Exercise per Session:    Stress:    • Feeling of Stress :    Social Connections:    • Frequency of Communication with Friends and Family:    • Frequency of Social Gatherings with Friends and Family:    • Attends Denominational Services:    • Active Member of Clubs or Organizations:    • Attends Club or Organization Meetings:    • Marital Status:    Intimate Partner Violence:    • Fear of Current or Ex-Partner:    • Emotionally Abused:    • Physically Abused:    • Sexually Abused:      Family History:    No family history on file.    Medications:    Current Outpatient Medications on File Prior to Visit   Medication Sig Dispense Refill   • acetaZOLAMIDE (DIAMOX) 250 MG Tab      • sertraline (ZOLOFT) 100 MG Tab   See Instructions, TAKE TWO TABLETS BY MOUTH ONCE DAILY FOR 90 DAYS, # 180 tab, 1 Refill(s), Acute, Pharmacy: [facility], TAKE TWO TABLETS BY MOUTH ONCE DAILY FOR 90 DAYS, 165.1 cm, 07/01/20 14:43:00 PDT, Height/Length (cm) Non-Measured, 106...     • levothyroxine (SYNTHROID) 175 MCG Tab      • buPROPion (WELLBUTRIN) 75 MG Tab     "  • Verapamil HCl  MG CAPSULE SR 24 HR   See Instructions, TAKE ONE CAPSULE BY MOUTH TWO TIMES A DAY FOR 90 DAYS, # 180 cap, 1 Refill(s), Acute, Pharmacy: [facility], TAKE ONE CAPSULE BY MOUTH TWO TIMES A DAY FOR 90 DAYS, 165.1 cm, 07/01/20 14:43:00 PDT, Height/Length (cm) Non-Kamla...       No current facility-administered medications on file prior to visit.     Allergies:    Allergies   Allergen Reactions   • Prochlorperazine Hives     Other reaction(s): Insomnia, Rapid heart rate   • Lamotrigine Anaphylaxis   • Topiramate Hives and Rash   • Compazine Anxiety and Palpitations   • Azithromycin Itching       Vitals:    Vitals:    02/19/21 1431   BP: 126/70   Pulse: 84   Resp: 16   Temp: 36.2 °C (97.1 °F)   SpO2: 97%   Weight: 110 kg (243 lb)   Height: 1.651 m (5' 5\")       Physical Exam:    Constitutional: Vital signs reviewed. Appears well-developed and well-nourished. No acute distress.   Eyes: Sclera white, conjunctivae clear.   ENT: External ears normal. Hearing normal.   Neck: Neck supple.   Pulmonary/Chest: Respirations non-labored.   Abdomen: Umbilicus is very deep inward umbilicus and I cannot see the base. There is some dried blood near the opening of umbilicus. The skin in the umbilicus I can see is normal. She is most tender to palpation in the umbilicus which is mild tenderness. Bowel sounds are normal active. Soft, non-distended, and otherwise non-tender to palpation.  Musculoskeletal: Normal gait. Normal range of motion. No muscular atrophy or weakness.  Neurological: Alert and oriented to person, place, and time. Muscle tone normal. Coordination normal.   Skin: No rashes or lesions. Warm, dry, normal turgor.  Psychiatric: Normal mood and affect. Behavior is normal. Judgment and thought content normal.       Assessment / Plan:    1. Skin infection  - doxycycline (VIBRAMYCIN) 100 MG Tab; Take 1 tablet by mouth 2 times a day for 7 days.  Dispense: 14 tablet; Refill: 0  - mupirocin " (BACTROBAN) 2 % Ointment; APPLY TO BELLY BUTTON INFECTION OR ANY WOUND 3 TIMES A DAY UNTIL BETTER.  Dispense: 22 g; Refill: 3      Discussed with her DDX, management options, and risks, benefits, and alternatives to treatment plan agreed upon.    I suspect she has the start of infection at the base of the umbilicus and recommended antibiotic treatment. She agrees.    Agreeable to medications prescribed.    Discussed expected course of duration, time for improvement, and to seek follow-up in Emergency Room, urgent care, or with PCP if getting worse at any time or not improving within expected time frame.

## 2023-08-07 ENCOUNTER — TELEPHONE (OUTPATIENT)
Dept: HEALTH INFORMATION MANAGEMENT | Facility: OTHER | Age: 33
End: 2023-08-07
Payer: COMMERCIAL

## 2023-08-11 ENCOUNTER — HOSPITAL ENCOUNTER (OUTPATIENT)
Dept: RADIOLOGY | Facility: MEDICAL CENTER | Age: 33
End: 2023-08-11
Attending: PSYCHIATRY & NEUROLOGY
Payer: COMMERCIAL

## 2023-08-11 ENCOUNTER — HOSPITAL ENCOUNTER (OUTPATIENT)
Facility: MEDICAL CENTER | Age: 33
End: 2023-08-11
Attending: PSYCHIATRY & NEUROLOGY
Payer: COMMERCIAL

## 2023-08-11 DIAGNOSIS — G93.2 BENIGN HYPERTENSION DUE TO INCREASED INTRACRANIAL PRESSURE: ICD-10-CM

## 2023-08-11 LAB
BURR CELLS/RBC NFR CSF MANUAL: 0 %
CLARITY CSF: CLEAR
COLOR CSF: COLORLESS
COLOR SPUN CSF: COLORLESS
CSF COMMENTS 1658: NORMAL
GLUCOSE CSF-MCNC: 60 MG/DL (ref 40–80)
NUC CELL # CSF: 1 CELLS/UL (ref 0–10)
PROT CSF-MCNC: 20 MG/DL (ref 15–45)
RBC # CSF: <1 CELLS/UL
SPECIMEN VOL CSF: 9 ML
TUBE # CSF: 3
TUBE # CSF: NORMAL

## 2023-08-11 PROCEDURE — 82945 GLUCOSE OTHER FLUID: CPT

## 2023-08-11 PROCEDURE — 83916 OLIGOCLONAL BANDS: CPT

## 2023-08-11 PROCEDURE — 84157 ASSAY OF PROTEIN OTHER: CPT

## 2023-08-11 PROCEDURE — 62328 DX LMBR SPI PNXR W/FLUOR/CT: CPT

## 2023-08-11 PROCEDURE — 89051 BODY FLUID CELL COUNT: CPT

## 2023-08-14 ENCOUNTER — HOSPITAL ENCOUNTER (EMERGENCY)
Facility: MEDICAL CENTER | Age: 33
End: 2023-08-14
Payer: MEDICAID

## 2023-08-14 VITALS
HEART RATE: 77 BPM | WEIGHT: 250.88 LBS | OXYGEN SATURATION: 97 % | RESPIRATION RATE: 16 BRPM | DIASTOLIC BLOOD PRESSURE: 88 MMHG | BODY MASS INDEX: 41.8 KG/M2 | TEMPERATURE: 98.4 F | HEIGHT: 65 IN | SYSTOLIC BLOOD PRESSURE: 141 MMHG

## 2023-08-14 PROCEDURE — 302449 STATCHG TRIAGE ONLY (STATISTIC)

## 2023-08-14 ASSESSMENT — FIBROSIS 4 INDEX: FIB4 SCORE: 0.8

## 2023-08-15 NOTE — ED TRIAGE NOTES
Flaca Ruiz  32 y.o.  Chief Complaint   Patient presents with    Post-Op Complications     S/p lumbar puncture this past Friday  Now with persistent back pain, nausea, and positional headaches  Spoke with MD who told her to present to the ED for a blood patch     Ambulatory to triage with steady gait for above. A & O x 4, GCS 15.    Currently rates headache 7/10.    Triage process explained to patient, apologized for wait time, and returned to lobby.

## 2023-12-08 ENCOUNTER — EH NON-PROVIDER (OUTPATIENT)
Dept: OCCUPATIONAL MEDICINE | Facility: CLINIC | Age: 33
End: 2023-12-08

## 2023-12-08 ENCOUNTER — EMPLOYEE HEALTH (OUTPATIENT)
Dept: OCCUPATIONAL MEDICINE | Facility: CLINIC | Age: 33
End: 2023-12-08

## 2023-12-08 ENCOUNTER — HOSPITAL ENCOUNTER (OUTPATIENT)
Facility: MEDICAL CENTER | Age: 33
End: 2023-12-08
Attending: PREVENTIVE MEDICINE
Payer: COMMERCIAL

## 2023-12-08 DIAGNOSIS — Z02.1 PRE-EMPLOYMENT DRUG SCREENING: ICD-10-CM

## 2023-12-08 DIAGNOSIS — Z02.89 ENCOUNTER FOR OCCUPATIONAL HEALTH ASSESSMENT: ICD-10-CM

## 2023-12-08 DIAGNOSIS — Z02.1 PRE-EMPLOYMENT HEALTH SCREENING EXAMINATION: ICD-10-CM

## 2023-12-08 PROCEDURE — 90707 MMR VACCINE SC: CPT | Performed by: PREVENTIVE MEDICINE

## 2023-12-08 PROCEDURE — 86480 TB TEST CELL IMMUN MEASURE: CPT | Performed by: PREVENTIVE MEDICINE

## 2023-12-08 PROCEDURE — 90716 VAR VACCINE LIVE SUBQ: CPT | Performed by: PREVENTIVE MEDICINE

## 2023-12-08 PROCEDURE — 90715 TDAP VACCINE 7 YRS/> IM: CPT | Performed by: PREVENTIVE MEDICINE

## 2023-12-08 PROCEDURE — 90471 IMMUNIZATION ADMIN: CPT | Performed by: PREVENTIVE MEDICINE

## 2023-12-08 PROCEDURE — 90472 IMMUNIZATION ADMIN EACH ADD: CPT | Performed by: PREVENTIVE MEDICINE

## 2023-12-08 PROCEDURE — 8915 PR COMPREHENSIVE PHYSICAL: Performed by: NURSE PRACTITIONER

## 2023-12-12 LAB
GAMMA INTERFERON BACKGROUND BLD IA-ACNC: 0.03 IU/ML
M TB IFN-G BLD-IMP: NEGATIVE
M TB IFN-G CD4+ BCKGRND COR BLD-ACNC: 0.01 IU/ML
MITOGEN IGNF BCKGRD COR BLD-ACNC: 0.52 IU/ML
QFT TB2 - NIL TBQ2: 0 IU/ML

## 2024-02-23 ENCOUNTER — HOSPITAL ENCOUNTER (OUTPATIENT)
Dept: LAB | Facility: MEDICAL CENTER | Age: 34
End: 2024-02-23
Attending: PHYSICIAN ASSISTANT
Payer: COMMERCIAL

## 2024-02-23 LAB
T4 FREE SERPL-MCNC: 1.7 NG/DL (ref 0.93–1.7)
TSH SERPL DL<=0.005 MIU/L-ACNC: 0.02 UIU/ML (ref 0.38–5.33)

## 2024-02-23 PROCEDURE — 36415 COLL VENOUS BLD VENIPUNCTURE: CPT

## 2024-02-23 PROCEDURE — 84439 ASSAY OF FREE THYROXINE: CPT

## 2024-02-23 PROCEDURE — 84443 ASSAY THYROID STIM HORMONE: CPT

## 2024-06-25 ENCOUNTER — HOSPITAL ENCOUNTER (OUTPATIENT)
Dept: LAB | Facility: MEDICAL CENTER | Age: 34
End: 2024-06-25
Attending: PHYSICIAN ASSISTANT
Payer: COMMERCIAL

## 2024-06-25 ENCOUNTER — HOSPITAL ENCOUNTER (OUTPATIENT)
Dept: LAB | Facility: MEDICAL CENTER | Age: 34
End: 2024-06-25
Attending: STUDENT IN AN ORGANIZED HEALTH CARE EDUCATION/TRAINING PROGRAM
Payer: COMMERCIAL

## 2024-06-25 LAB
ALBUMIN SERPL BCP-MCNC: 4.2 G/DL (ref 3.2–4.9)
ALBUMIN/GLOB SERPL: 1.7 G/DL
ALP SERPL-CCNC: 111 U/L (ref 30–99)
ALT SERPL-CCNC: 16 U/L (ref 2–50)
ANION GAP SERPL CALC-SCNC: 12 MMOL/L (ref 7–16)
AST SERPL-CCNC: 16 U/L (ref 12–45)
BASOPHILS # BLD AUTO: 0.1 % (ref 0–1.8)
BASOPHILS # BLD: 0.01 K/UL (ref 0–0.12)
BILIRUB SERPL-MCNC: 0.3 MG/DL (ref 0.1–1.5)
BUN SERPL-MCNC: 13 MG/DL (ref 8–22)
CALCIUM ALBUM COR SERPL-MCNC: 9.1 MG/DL (ref 8.5–10.5)
CALCIUM SERPL-MCNC: 9.3 MG/DL (ref 8.5–10.5)
CHLORIDE SERPL-SCNC: 107 MMOL/L (ref 96–112)
CHOLEST SERPL-MCNC: 145 MG/DL (ref 100–199)
CO2 SERPL-SCNC: 22 MMOL/L (ref 20–33)
CREAT SERPL-MCNC: 0.89 MG/DL (ref 0.5–1.4)
EOSINOPHIL # BLD AUTO: 0 K/UL (ref 0–0.51)
EOSINOPHIL NFR BLD: 0 % (ref 0–6.9)
ERYTHROCYTE [DISTWIDTH] IN BLOOD BY AUTOMATED COUNT: 41.7 FL (ref 35.9–50)
EST. AVERAGE GLUCOSE BLD GHB EST-MCNC: 117 MG/DL
FASTING STATUS PATIENT QL REPORTED: NORMAL
GFR SERPLBLD CREATININE-BSD FMLA CKD-EPI: 87 ML/MIN/1.73 M 2
GLOBULIN SER CALC-MCNC: 2.5 G/DL (ref 1.9–3.5)
GLUCOSE SERPL-MCNC: 98 MG/DL (ref 65–99)
HBA1C MFR BLD: 5.7 % (ref 4–5.6)
HCT VFR BLD AUTO: 43.3 % (ref 37–47)
HDLC SERPL-MCNC: 35 MG/DL
HGB BLD-MCNC: 14 G/DL (ref 12–16)
IMM GRANULOCYTES # BLD AUTO: 0.02 K/UL (ref 0–0.11)
IMM GRANULOCYTES NFR BLD AUTO: 0.2 % (ref 0–0.9)
LDLC SERPL CALC-MCNC: 90 MG/DL
LYMPHOCYTES # BLD AUTO: 2.1 K/UL (ref 1–4.8)
LYMPHOCYTES NFR BLD: 21.7 % (ref 22–41)
MCH RBC QN AUTO: 27 PG (ref 27–33)
MCHC RBC AUTO-ENTMCNC: 32.3 G/DL (ref 32.2–35.5)
MCV RBC AUTO: 83.6 FL (ref 81.4–97.8)
MONOCYTES # BLD AUTO: 0.72 K/UL (ref 0–0.85)
MONOCYTES NFR BLD AUTO: 7.5 % (ref 0–13.4)
NEUTROPHILS # BLD AUTO: 6.81 K/UL (ref 1.82–7.42)
NEUTROPHILS NFR BLD: 70.5 % (ref 44–72)
NRBC # BLD AUTO: 0 K/UL
NRBC BLD-RTO: 0 /100 WBC (ref 0–0.2)
PLATELET # BLD AUTO: 223 K/UL (ref 164–446)
PMV BLD AUTO: 13.1 FL (ref 9–12.9)
POTASSIUM SERPL-SCNC: 4.8 MMOL/L (ref 3.6–5.5)
PROT SERPL-MCNC: 6.7 G/DL (ref 6–8.2)
RBC # BLD AUTO: 5.18 M/UL (ref 4.2–5.4)
SODIUM SERPL-SCNC: 141 MMOL/L (ref 135–145)
T4 FREE SERPL-MCNC: 1.04 NG/DL (ref 0.93–1.7)
TRIGL SERPL-MCNC: 100 MG/DL (ref 0–149)
TSH SERPL DL<=0.005 MIU/L-ACNC: 0.09 UIU/ML (ref 0.38–5.33)
WBC # BLD AUTO: 9.7 K/UL (ref 4.8–10.8)

## 2024-06-25 PROCEDURE — 85025 COMPLETE CBC W/AUTO DIFF WBC: CPT

## 2024-06-25 PROCEDURE — 84439 ASSAY OF FREE THYROXINE: CPT

## 2024-06-25 PROCEDURE — 80061 LIPID PANEL: CPT

## 2024-06-25 PROCEDURE — 83036 HEMOGLOBIN GLYCOSYLATED A1C: CPT

## 2024-06-25 PROCEDURE — 80053 COMPREHEN METABOLIC PANEL: CPT

## 2024-06-25 PROCEDURE — 84443 ASSAY THYROID STIM HORMONE: CPT

## 2024-06-25 PROCEDURE — 36415 COLL VENOUS BLD VENIPUNCTURE: CPT

## 2024-07-29 ENCOUNTER — HOSPITAL ENCOUNTER (OUTPATIENT)
Dept: LAB | Facility: MEDICAL CENTER | Age: 34
End: 2024-07-29
Attending: PSYCHIATRY & NEUROLOGY
Payer: COMMERCIAL

## 2024-07-29 LAB
APTT PPP: 33.6 SEC (ref 24.7–36)
BASOPHILS # BLD AUTO: 0.1 % (ref 0–1.8)
BASOPHILS # BLD: 0.01 K/UL (ref 0–0.12)
EOSINOPHIL # BLD AUTO: 0 K/UL (ref 0–0.51)
EOSINOPHIL NFR BLD: 0 % (ref 0–6.9)
ERYTHROCYTE [DISTWIDTH] IN BLOOD BY AUTOMATED COUNT: 42.5 FL (ref 35.9–50)
HCT VFR BLD AUTO: 41.7 % (ref 37–47)
HGB BLD-MCNC: 13.7 G/DL (ref 12–16)
IMM GRANULOCYTES # BLD AUTO: 0.02 K/UL (ref 0–0.11)
IMM GRANULOCYTES NFR BLD AUTO: 0.2 % (ref 0–0.9)
INR PPP: 0.92 (ref 0.87–1.13)
LYMPHOCYTES # BLD AUTO: 2.81 K/UL (ref 1–4.8)
LYMPHOCYTES NFR BLD: 30.4 % (ref 22–41)
MCH RBC QN AUTO: 27.5 PG (ref 27–33)
MCHC RBC AUTO-ENTMCNC: 32.9 G/DL (ref 32.2–35.5)
MCV RBC AUTO: 83.6 FL (ref 81.4–97.8)
MONOCYTES # BLD AUTO: 0.66 K/UL (ref 0–0.85)
MONOCYTES NFR BLD AUTO: 7.1 % (ref 0–13.4)
NEUTROPHILS # BLD AUTO: 5.74 K/UL (ref 1.82–7.42)
NEUTROPHILS NFR BLD: 62.2 % (ref 44–72)
NRBC # BLD AUTO: 0 K/UL
NRBC BLD-RTO: 0 /100 WBC (ref 0–0.2)
PLATELET # BLD AUTO: 237 K/UL (ref 164–446)
PMV BLD AUTO: 13.1 FL (ref 9–12.9)
PROTHROMBIN TIME: 12.4 SEC (ref 12–14.6)
RBC # BLD AUTO: 4.99 M/UL (ref 4.2–5.4)
WBC # BLD AUTO: 9.2 K/UL (ref 4.8–10.8)

## 2024-07-29 PROCEDURE — 85730 THROMBOPLASTIN TIME PARTIAL: CPT

## 2024-07-29 PROCEDURE — 85610 PROTHROMBIN TIME: CPT

## 2024-07-29 PROCEDURE — 85025 COMPLETE CBC W/AUTO DIFF WBC: CPT

## 2024-07-29 PROCEDURE — 36415 COLL VENOUS BLD VENIPUNCTURE: CPT

## 2024-08-05 ENCOUNTER — HOSPITAL ENCOUNTER (OUTPATIENT)
Facility: MEDICAL CENTER | Age: 34
End: 2024-08-05
Attending: NURSE PRACTITIONER
Payer: COMMERCIAL

## 2024-08-05 ENCOUNTER — HOSPITAL ENCOUNTER (OUTPATIENT)
Dept: RADIOLOGY | Facility: MEDICAL CENTER | Age: 34
End: 2024-08-05
Attending: NURSE PRACTITIONER
Payer: COMMERCIAL

## 2024-08-05 DIAGNOSIS — E66.3 OVER WEIGHT: ICD-10-CM

## 2024-08-05 DIAGNOSIS — E07.89 OTHER SPECIFIED DISORDERS OF THYROID: ICD-10-CM

## 2024-08-05 DIAGNOSIS — G93.2 BENIGN INTRACRANIAL HYPERTENSION: ICD-10-CM

## 2024-08-05 DIAGNOSIS — F06.4 ANXIETY DISORDER DUE TO KNOWN PHYSIOLOGICAL CONDITION: ICD-10-CM

## 2024-08-05 DIAGNOSIS — G43.009 MIGRAINE WITHOUT AURA AND WITHOUT STATUS MIGRAINOSUS, NOT INTRACTABLE: ICD-10-CM

## 2024-08-05 LAB
BURR CELLS/RBC NFR CSF MANUAL: 0 %
CLARITY CSF: CLEAR
COLOR CSF: COLORLESS
COLOR SPUN CSF: COLORLESS
GLUCOSE CSF-MCNC: 60 MG/DL (ref 40–80)
NEUTROPHILS NFR CSF: 0 %
NUC CELL # CSF: <1 CELLS/UL (ref 0–10)
PROT CSF-MCNC: 20 MG/DL (ref 15–45)
RBC # CSF: 1 CELLS/UL
SPECIMEN VOL CSF: 11 ML
TUBE # CSF: 3
TUBE # CSF: NORMAL

## 2024-08-05 PROCEDURE — 62328 DX LMBR SPI PNXR W/FLUOR/CT: CPT

## 2024-08-05 PROCEDURE — 83916 OLIGOCLONAL BANDS: CPT

## 2024-08-05 PROCEDURE — 89051 BODY FLUID CELL COUNT: CPT

## 2024-08-05 PROCEDURE — 82945 GLUCOSE OTHER FLUID: CPT

## 2024-08-05 PROCEDURE — 84157 ASSAY OF PROTEIN OTHER: CPT

## 2024-08-08 LAB
OLIGOCLONAL BANDS CSF ELPH-IMP: NORMAL
OLIGOCLONAL BANDS CSF IEF: NORMAL BANDS (ref 0–1)
OLIGOCLONAL BANDS.IT SER+CSF QL: NEGATIVE

## 2024-09-25 ENCOUNTER — HOSPITAL ENCOUNTER (OUTPATIENT)
Dept: LAB | Facility: MEDICAL CENTER | Age: 34
End: 2024-09-25
Attending: INTERNAL MEDICINE
Payer: COMMERCIAL

## 2024-09-25 PROCEDURE — 36415 COLL VENOUS BLD VENIPUNCTURE: CPT

## 2024-09-25 PROCEDURE — 84439 ASSAY OF FREE THYROXINE: CPT

## 2024-09-25 PROCEDURE — 84443 ASSAY THYROID STIM HORMONE: CPT

## 2024-09-26 LAB
T4 FREE SERPL-MCNC: 1.42 NG/DL (ref 0.93–1.7)
TSH SERPL-ACNC: 0.14 UIU/ML (ref 0.35–5.5)

## 2025-03-18 ENCOUNTER — OFFICE VISIT (OUTPATIENT)
Dept: URGENT CARE | Facility: PHYSICIAN GROUP | Age: 35
End: 2025-03-18
Payer: COMMERCIAL

## 2025-03-18 ENCOUNTER — HOSPITAL ENCOUNTER (OUTPATIENT)
Dept: LAB | Facility: MEDICAL CENTER | Age: 35
End: 2025-03-18
Attending: INTERNAL MEDICINE
Payer: COMMERCIAL

## 2025-03-18 VITALS
TEMPERATURE: 97.9 F | HEART RATE: 79 BPM | BODY MASS INDEX: 40.82 KG/M2 | OXYGEN SATURATION: 98 % | WEIGHT: 245 LBS | RESPIRATION RATE: 18 BRPM | SYSTOLIC BLOOD PRESSURE: 130 MMHG | DIASTOLIC BLOOD PRESSURE: 72 MMHG | HEIGHT: 65 IN

## 2025-03-18 DIAGNOSIS — R11.0 NAUSEA: ICD-10-CM

## 2025-03-18 DIAGNOSIS — R51.9 CHRONIC INTRACTABLE HEADACHE, UNSPECIFIED HEADACHE TYPE: ICD-10-CM

## 2025-03-18 DIAGNOSIS — G89.29 CHRONIC INTRACTABLE HEADACHE, UNSPECIFIED HEADACHE TYPE: ICD-10-CM

## 2025-03-18 LAB
T4 FREE SERPL-MCNC: 1 NG/DL (ref 0.93–1.7)
TSH SERPL-ACNC: 2.56 UIU/ML (ref 0.35–5.5)

## 2025-03-18 PROCEDURE — 84443 ASSAY THYROID STIM HORMONE: CPT

## 2025-03-18 PROCEDURE — 84439 ASSAY OF FREE THYROXINE: CPT

## 2025-03-18 PROCEDURE — 99213 OFFICE O/P EST LOW 20 MIN: CPT | Performed by: FAMILY MEDICINE

## 2025-03-18 PROCEDURE — 3075F SYST BP GE 130 - 139MM HG: CPT | Performed by: FAMILY MEDICINE

## 2025-03-18 PROCEDURE — 36415 COLL VENOUS BLD VENIPUNCTURE: CPT

## 2025-03-18 PROCEDURE — 3078F DIAST BP <80 MM HG: CPT | Performed by: FAMILY MEDICINE

## 2025-03-18 RX ORDER — DIAZEPAM 5 MG/1
TABLET ORAL
COMMUNITY
Start: 2024-12-23

## 2025-03-18 RX ORDER — ONDANSETRON 4 MG/1
4 TABLET, FILM COATED ORAL EVERY 4 HOURS PRN
Qty: 20 TABLET | Refills: 0 | Status: SHIPPED | OUTPATIENT
Start: 2025-03-18

## 2025-03-18 RX ORDER — ALBUTEROL SULFATE 90 UG/1
INHALANT RESPIRATORY (INHALATION)
COMMUNITY
Start: 2025-01-08

## 2025-03-18 RX ORDER — KETOROLAC TROMETHAMINE 15 MG/ML
15 INJECTION, SOLUTION INTRAMUSCULAR; INTRAVENOUS ONCE
Status: COMPLETED | OUTPATIENT
Start: 2025-03-18 | End: 2025-03-18

## 2025-03-18 RX ORDER — ONDANSETRON 4 MG/1
TABLET, ORALLY DISINTEGRATING ORAL
COMMUNITY

## 2025-03-18 RX ORDER — OMEPRAZOLE 40 MG/1
40 CAPSULE, DELAYED RELEASE ORAL
COMMUNITY
Start: 2025-02-20

## 2025-03-18 RX ORDER — RIMEGEPANT SULFATE 75 MG/75MG
75 TABLET, ORALLY DISINTEGRATING ORAL
COMMUNITY

## 2025-03-18 RX ADMIN — KETOROLAC TROMETHAMINE 15 MG: 15 INJECTION, SOLUTION INTRAMUSCULAR; INTRAVENOUS at 16:03

## 2025-03-18 ASSESSMENT — FIBROSIS 4 INDEX: FIB4 SCORE: 0.57

## 2025-03-18 ASSESSMENT — ENCOUNTER SYMPTOMS
CONSTITUTIONAL NEGATIVE: 1
RESPIRATORY NEGATIVE: 1
HEADACHES: 1
NAUSEA: 1
CARDIOVASCULAR NEGATIVE: 1

## 2025-03-18 NOTE — PROGRESS NOTES
"Subjective:   Flaca Ruiz is a 34 y.o. female who presents for Headache (Migraine HA/Would like a toradol shot. Sx 4 days)      Headache      Review of Systems   Constitutional: Negative.    HENT: Negative.     Respiratory: Negative.     Cardiovascular: Negative.    Gastrointestinal:  Positive for nausea.   Genitourinary: Negative.    Skin: Negative.    Neurological:  Positive for headaches.       Medications, Allergies, and current problem list reviewed today in Epic.     Objective:     /72   Pulse 79   Temp 36.6 °C (97.9 °F) (Temporal)   Resp 18   Ht 1.651 m (5' 5\")   Wt 111 kg (245 lb)   SpO2 98%     Physical Exam  Vitals and nursing note reviewed.   Constitutional:       General: She is in acute distress.   HENT:      Head: Normocephalic and atraumatic.      Right Ear: Tympanic membrane normal.      Left Ear: Tympanic membrane normal.      Nose: Nose normal.      Mouth/Throat:      Pharynx: Oropharynx is clear.   Cardiovascular:      Rate and Rhythm: Normal rate and regular rhythm.      Pulses: Normal pulses.      Heart sounds: Normal heart sounds.   Musculoskeletal:      Cervical back: Normal range of motion and neck supple.   Neurological:      General: No focal deficit present.      Mental Status: She is oriented to person, place, and time. Mental status is at baseline.      Cranial Nerves: No cranial nerve deficit.      Sensory: No sensory deficit.      Motor: No weakness.      Coordination: Coordination normal.      Gait: Gait normal.      Deep Tendon Reflexes: Reflexes normal.         Assessment/Plan:     Diagnosis and associated orders:     1. Nausea  ondansetron (ZOFRAN) 4 MG Tab tablet      2. Chronic intractable headache, unspecified headache type  ketorolac (Toradol) 15 MG/ML injection 15 mg         Comments/MDM:              Differential diagnosis, natural history, supportive care, and indications for immediate follow-up discussed.    Advised the patient to follow-up with the " primary care physician for recheck, reevaluation, and consideration of further management.    Please note that this dictation was created using voice recognition software. I have made a reasonable attempt to correct obvious errors, but I expect that there are errors of grammar and possibly content that I did not discover before finalizing the note.    This note was electronically signed by Matheus Winn M.D.

## 2025-05-30 ENCOUNTER — HOSPITAL ENCOUNTER (OUTPATIENT)
Dept: LAB | Facility: MEDICAL CENTER | Age: 35
End: 2025-05-30
Attending: NURSE PRACTITIONER
Payer: COMMERCIAL

## 2025-05-30 LAB
APTT PPP: 32 SEC (ref 24.7–36)
BASOPHILS # BLD AUTO: 0.2 % (ref 0–1.8)
BASOPHILS # BLD: 0.02 K/UL (ref 0–0.12)
EOSINOPHIL # BLD AUTO: 0 K/UL (ref 0–0.51)
EOSINOPHIL NFR BLD: 0 % (ref 0–6.9)
ERYTHROCYTE [DISTWIDTH] IN BLOOD BY AUTOMATED COUNT: 42.9 FL (ref 35.9–50)
HCT VFR BLD AUTO: 42 % (ref 37–47)
HGB BLD-MCNC: 13.3 G/DL (ref 12–16)
IMM GRANULOCYTES # BLD AUTO: 0.03 K/UL (ref 0–0.11)
IMM GRANULOCYTES NFR BLD AUTO: 0.3 % (ref 0–0.9)
INR PPP: 0.97 (ref 0.87–1.13)
LYMPHOCYTES # BLD AUTO: 2.69 K/UL (ref 1–4.8)
LYMPHOCYTES NFR BLD: 25.1 % (ref 22–41)
MCH RBC QN AUTO: 26.9 PG (ref 27–33)
MCHC RBC AUTO-ENTMCNC: 31.7 G/DL (ref 32.2–35.5)
MCV RBC AUTO: 85 FL (ref 81.4–97.8)
MONOCYTES # BLD AUTO: 0.84 K/UL (ref 0–0.85)
MONOCYTES NFR BLD AUTO: 7.8 % (ref 0–13.4)
NEUTROPHILS # BLD AUTO: 7.13 K/UL (ref 1.82–7.42)
NEUTROPHILS NFR BLD: 66.6 % (ref 44–72)
NRBC # BLD AUTO: 0 K/UL
NRBC BLD-RTO: 0 /100 WBC (ref 0–0.2)
PLATELET # BLD AUTO: 214 K/UL (ref 164–446)
PMV BLD AUTO: 12.3 FL (ref 9–12.9)
PROTHROMBIN TIME: 12.9 SEC (ref 12–14.6)
RBC # BLD AUTO: 4.94 M/UL (ref 4.2–5.4)
WBC # BLD AUTO: 10.7 K/UL (ref 4.8–10.8)

## 2025-05-30 PROCEDURE — 36415 COLL VENOUS BLD VENIPUNCTURE: CPT

## 2025-05-30 PROCEDURE — 85610 PROTHROMBIN TIME: CPT

## 2025-05-30 PROCEDURE — 85025 COMPLETE CBC W/AUTO DIFF WBC: CPT

## 2025-05-30 PROCEDURE — 85730 THROMBOPLASTIN TIME PARTIAL: CPT

## 2025-06-04 ENCOUNTER — HOSPITAL ENCOUNTER (OUTPATIENT)
Facility: MEDICAL CENTER | Age: 35
End: 2025-06-04
Attending: NURSE PRACTITIONER
Payer: COMMERCIAL

## 2025-06-04 ENCOUNTER — HOSPITAL ENCOUNTER (OUTPATIENT)
Dept: RADIOLOGY | Facility: MEDICAL CENTER | Age: 35
End: 2025-06-04
Attending: NURSE PRACTITIONER
Payer: COMMERCIAL

## 2025-06-04 DIAGNOSIS — G93.2 BENIGN INTRACRANIAL HYPERTENSION: ICD-10-CM

## 2025-06-04 DIAGNOSIS — G43.009 ATYPICAL MIGRAINE: ICD-10-CM

## 2025-06-04 DIAGNOSIS — F06.4 ORGANIC ANXIETY DISORDER: ICD-10-CM

## 2025-06-04 DIAGNOSIS — E66.3 SEVERELY OVERWEIGHT: ICD-10-CM

## 2025-06-04 DIAGNOSIS — E07.89 HEMORRHAGE AND INFARCTION OF THYROID: ICD-10-CM

## 2025-06-04 LAB
BURR CELLS/RBC NFR CSF MANUAL: 0 %
CLARITY CSF: CLEAR
COLOR CSF: COLORLESS
COLOR SPUN CSF: COLORLESS
GLUCOSE CSF-MCNC: 54 MG/DL (ref 40–80)
LYMPHOCYTES NFR CSF: 88 %
MONOS+MACROS NFR CSF MANUAL: 10 %
NEUTROPHILS NFR CSF: 2 %
NUC CELL # CSF: <1 CELLS/UL (ref 0–10)
PROT CSF-MCNC: 22 MG/DL (ref 15–45)
RBC # CSF: 50 CELLS/UL
SPECIMEN VOL CSF: 7 ML
TUBE # CSF: 3
TUBE # CSF: NORMAL

## 2025-06-04 PROCEDURE — 83916 OLIGOCLONAL BANDS: CPT

## 2025-06-04 PROCEDURE — 89051 BODY FLUID CELL COUNT: CPT

## 2025-06-04 PROCEDURE — 82945 GLUCOSE OTHER FLUID: CPT

## 2025-06-04 PROCEDURE — 62328 DX LMBR SPI PNXR W/FLUOR/CT: CPT

## 2025-06-04 PROCEDURE — 84157 ASSAY OF PROTEIN OTHER: CPT

## 2025-06-07 ENCOUNTER — HOSPITAL ENCOUNTER (EMERGENCY)
Facility: MEDICAL CENTER | Age: 35
End: 2025-06-07
Attending: STUDENT IN AN ORGANIZED HEALTH CARE EDUCATION/TRAINING PROGRAM
Payer: COMMERCIAL

## 2025-06-07 ENCOUNTER — ANESTHESIA (OUTPATIENT)
Dept: ANESTHESIOLOGY | Facility: MEDICAL CENTER | Age: 35
End: 2025-06-07
Payer: COMMERCIAL

## 2025-06-07 ENCOUNTER — ANESTHESIA EVENT (OUTPATIENT)
Dept: ANESTHESIOLOGY | Facility: MEDICAL CENTER | Age: 35
End: 2025-06-07
Payer: COMMERCIAL

## 2025-06-07 VITALS
HEART RATE: 71 BPM | OXYGEN SATURATION: 98 % | SYSTOLIC BLOOD PRESSURE: 138 MMHG | TEMPERATURE: 97.8 F | DIASTOLIC BLOOD PRESSURE: 89 MMHG | HEIGHT: 65 IN | WEIGHT: 264.33 LBS | RESPIRATION RATE: 13 BRPM | BODY MASS INDEX: 44.04 KG/M2

## 2025-06-07 DIAGNOSIS — G97.1 POST-DURAL PUNCTURE HEADACHE: Primary | ICD-10-CM

## 2025-06-07 LAB
HCG SERPL QL: NEGATIVE
OLIGOCLONAL BANDS CSF ELPH-IMP: NORMAL
OLIGOCLONAL BANDS CSF IEF: NORMAL BANDS (ref 0–1)
OLIGOCLONAL BANDS.IT SER+CSF QL: NEGATIVE

## 2025-06-07 PROCEDURE — 700102 HCHG RX REV CODE 250 W/ 637 OVERRIDE(OP): Performed by: STUDENT IN AN ORGANIZED HEALTH CARE EDUCATION/TRAINING PROGRAM

## 2025-06-07 PROCEDURE — 84703 CHORIONIC GONADOTROPIN ASSAY: CPT

## 2025-06-07 PROCEDURE — 160035 HCHG PACU - 1ST 60 MINS PHASE I

## 2025-06-07 PROCEDURE — 700105 HCHG RX REV CODE 258: Performed by: STUDENT IN AN ORGANIZED HEALTH CARE EDUCATION/TRAINING PROGRAM

## 2025-06-07 PROCEDURE — 62273 INJECT EPIDURAL PATCH: CPT

## 2025-06-07 PROCEDURE — 96361 HYDRATE IV INFUSION ADD-ON: CPT

## 2025-06-07 PROCEDURE — 96374 THER/PROPH/DIAG INJ IV PUSH: CPT

## 2025-06-07 PROCEDURE — A9270 NON-COVERED ITEM OR SERVICE: HCPCS | Performed by: STUDENT IN AN ORGANIZED HEALTH CARE EDUCATION/TRAINING PROGRAM

## 2025-06-07 PROCEDURE — 36415 COLL VENOUS BLD VENIPUNCTURE: CPT

## 2025-06-07 PROCEDURE — 99291 CRITICAL CARE FIRST HOUR: CPT

## 2025-06-07 PROCEDURE — 160002 HCHG RECOVERY MINUTES (STAT)

## 2025-06-07 PROCEDURE — 700111 HCHG RX REV CODE 636 W/ 250 OVERRIDE (IP): Mod: JZ | Performed by: STUDENT IN AN ORGANIZED HEALTH CARE EDUCATION/TRAINING PROGRAM

## 2025-06-07 RX ORDER — KETOROLAC TROMETHAMINE 15 MG/ML
15 INJECTION, SOLUTION INTRAMUSCULAR; INTRAVENOUS ONCE
Status: COMPLETED | OUTPATIENT
Start: 2025-06-07 | End: 2025-06-07

## 2025-06-07 RX ORDER — SODIUM CHLORIDE, SODIUM LACTATE, POTASSIUM CHLORIDE, CALCIUM CHLORIDE 600; 310; 30; 20 MG/100ML; MG/100ML; MG/100ML; MG/100ML
1000 INJECTION, SOLUTION INTRAVENOUS ONCE
Status: COMPLETED | OUTPATIENT
Start: 2025-06-07 | End: 2025-06-07

## 2025-06-07 RX ADMIN — KETOROLAC TROMETHAMINE 15 MG: 15 INJECTION, SOLUTION INTRAMUSCULAR; INTRAVENOUS at 19:01

## 2025-06-07 RX ADMIN — ACETAMINOPHEN, ASPIRIN, CAFFEINE 1 TABLET: 250; 65; 250 TABLET, FILM COATED ORAL at 17:33

## 2025-06-07 RX ADMIN — SODIUM CHLORIDE, POTASSIUM CHLORIDE, SODIUM LACTATE AND CALCIUM CHLORIDE 1000 ML: 600; 310; 30; 20 INJECTION, SOLUTION INTRAVENOUS at 18:57

## 2025-06-07 ASSESSMENT — FIBROSIS 4 INDEX: FIB4 SCORE: 0.64

## 2025-06-07 NOTE — ED PROVIDER NOTES
ER Provider Note    Scribed for Fei Lopez M.D. by Bianca Ontiveros. 2025  4:57 PM    Primary Care Provider: Bowen Alatorre II, M.D.    CHIEF COMPLAINT   Chief Complaint   Patient presents with    Post-Op Complications     Pt states she had a spinal tap on Wednesday.  Pt says since then she had been having headaches when she stands.  Pt also has noted some drainage from bilateral nostrils and burning pain to her right sinuses when standing. Pt concerned she has a CSF leak.       EXTERNAL RECORDS REVIEWED  Outpatient Notes Patient was seen at [facility] on 2025 for nausea and chronic intractable headache. She was given Zofran and Toradol for her symptoms.      HPI/ROS  LIMITATION TO HISTORY   Select: : None  OUTSIDE HISTORIAN(S):  None.     Flaca Ruiz is a 34 y.o. female 3 days status post lumbar puncture who presents to the ED for a positional headache. Patient states that she has a history of idiopathic intracranial hypertension and states that her headache comes on when she sits up or stands. She also notes that she experiences a runny nose when she sits or stands up. Patient has been taking Naproxen at home for her symptoms. She denies recent sickness, erythema around the site, new numbness/tingling, and any other symptoms or injuries at this time.     PAST MEDICAL HISTORY  Past Medical History:   Diagnosis Date    Anxiety     Celiac disease     Depression     Disorder of thyroid     Endometriosis     GERD (gastroesophageal reflux disease)     Hives     Intracranial hypertension      SURGICAL HISTORY  Past Surgical History:   Procedure Laterality Date    PRIMARY C SECTION N/A 2022    Procedure:  SECTION, PRIMARY;  Surgeon: Rey Jay M.D.;  Location: [facility];  Service: Labor and Delivery    GYN SURGERY      THYROID LOBECTOMY      TONSILLECTOMY AND ADENOIDECTOMY       FAMILY HISTORY  No family history noted.     SOCIAL HISTORY   reports that she  has never smoked. She has never used smokeless tobacco. She reports that she does not currently use alcohol. She reports that she does not currently use drugs.    CURRENT MEDICATIONS  Current Discharge Medication List        CONTINUE these medications which have NOT CHANGED    Details   diazePAM (VALIUM) 5 MG Tab TAKE ONE Tablet BY MOUTH EVERY 8 HOURS AS NEEDED FOR SPASMS FOR 3 DAYS      albuterol 108 (90 Base) MCG/ACT Aero Soln inhalation aerosol INHALE 2 PUFFS BY MOUTH EVERY 6 HOURS AS NEEDED FOR WHEEZING      omeprazole (PRILOSEC) 40 MG delayed-release capsule Take 40 mg by mouth every day.      ondansetron (ZOFRAN ODT) 4 MG TABLET DISPERSIBLE       Rimegepant Sulfate (NURTEC) 75 MG TABLET DISPERSIBLE Take 75 mg by mouth.      ondansetron (ZOFRAN) 4 MG Tab tablet Take 1 Tablet by mouth every four hours as needed for Nausea/Vomiting.  Qty: 20 Tablet, Refills: 0    Associated Diagnoses: Nausea      labetalol (NORMODYNE) 300 MG Tab Take 1 Tablet by mouth every 8 hours.  Qty: 60 Tablet, Refills: 0    Associated Diagnoses: Gestational hypertension, third trimester      ferrous gluconate (FERGON) 324 (38 Fe) MG Tab Take 1 Tablet by mouth every morning with breakfast.  Qty: 30 Tablet, Refills: 0    Associated Diagnoses: Acute blood loss anemia      ibuprofen (MOTRIN) 800 MG Tab Take 1 Tablet by mouth every 8 hours. Indications: Joint Damage causing Pain and Loss of Function  Qty: 20 Tablet, Refills: 0    Associated Diagnoses: Postoperative pain      fexofenadine (ALLEGRA) 180 MG tablet Take 360 mg by mouth every day. 360 mg = 2 tablets      vitamin D3 (CHOLECALCIFEROL) 400 UNIT Tab Take 1,000 Units by mouth every day.      sertraline (ZOLOFT) 100 MG Tab Take 100 mg by mouth every day.      levothyroxine (SYNTHROID) 112 MCG Tab Take 224-336 mcg by mouth every morning on an empty stomach. 224mcg 5 days a week  336mcg 2 days a week           ALLERGIES  Prochlorperazine, Lamotrigine, Topiramate, Azithromycin, and  "Fremanezumab-[facility]    PHYSICAL EXAM  BP (!) 134/90   Pulse 83   Temp 36 °C (96.8 °F) (Temporal)   Resp 16   Ht 1.651 m (5' 5\")   Wt 120 kg (264 lb 5.3 oz)   SpO2 95%   BMI 43.99 kg/m²   Constitutional: Well developed, Well nourished, No acute distress, Non-toxic appearance.   HENT: Normocephalic, Atraumatic, Bilateral external ears normal, Oropharynx is clear mucous membranes are moist. No oral exudates or nasal discharge.   Eyes: Pupils are equal round and reactive, EOMI, Conjunctiva normal, No discharge.   Neck: Normal range of motion, No tenderness, Supple, No stridor. No meningismus.  Lymphatic: No lymphadenopathy noted.   Cardiovascular: Regular rate and rhythm without murmur rub or gallop.  Thorax & Lungs: Clear breath sounds bilaterally without wheezes, rhonchi or rales. There is no chest wall tenderness.   Abdomen: Soft non-tender non-distended. There is no rebound or guarding. No organomegaly is appreciated. Bowel sounds are normal.  Skin: Previous puncture from lumbar puncture, no surrounding erythema, no fluctuance, no tenderness    Back: No CVA or spinal tenderness.   Extremities: Intact distal pulses, No edema, No tenderness, No cyanosis, No clubbing. Capillary refill is less than 2 seconds.  Musculoskeletal: Good range of motion in all major joints. No tenderness to palpation or major deformities noted.   Neurologic: Alert & oriented x 3, Normal motor function, Normal sensory function, No focal deficits noted. Reflexes are normal.  Psychiatric: Affect normal, Judgment normal, Mood normal.     DIAGNOSTIC STUDIES    LABS  Labs Reviewed   HCG QUAL SERUM     COURSE & MEDICAL DECISION MAKING     ASSESSMENT, COURSE AND PLAN  Care Narrative:     4:57 PM - 5:38 PM Patient presents to the ED status post lumbar puncture with a headache.  No fevers or chills, neurologically intact.  Symptoms worse when sitting or standing, better when lying flat.  Did have an LP for idiopathic intracranial hypertension done " several days ago.  On exam patient has no evidence of any infection around her LP puncture site.  No fluctuance, no erythema.  She is neurologically intact.  Patient will be treated with 1 tablet Excedrin PO. Discussed with patient plan to consult anesthesia. Patient understands and agrees to plan of care.    5:16 PM - Paged for anesthesia    5:20 PM - I discussed the patient's case and the above findings with Dr. Segura (anesthesia) who will come and evaluate patient.     6:00 PM - I have spoken to Dr. Segura again who states that they are currently in an emergent procedure and that it will take awhile before they are able to see patients. They provided medication recommendations and I have therefore ordered for patient to be medicated with 1L LR IV and 15 mg Toradol IV.     Hydration: Based on the patient's presentation of Other posterior puncture headache the patient was given IV fluids. IV Hydration was used because oral hydration was not adequate alone. Upon recheck following hydration, the patient was improved.    7:41 PM - Patient was reevaluated at bedside. At this time despite being given pain medicine patient is still having symptoms. I discussed this with Dr. Segura (anesthesia) and he states that he has sent for patient to go to the procedure suite for a blood patch and that he will discharge patient from there.    Patient went down to the anesthesia suite, received a blood patch, had significant improvement in her symptoms and was discharged.  Differential diagnosis considered.  Patient presentation consistent with postural headache, received epidural blood patch with improvement in symptoms.  Doubt meningitis, encephalitis, acute infection, spinal epidural abscess, spinal cord compression or other acute emergency.          ADDITIONAL PROBLEM LIST  Postdural puncture headache    DISPOSITION AND DISCUSSIONS  I have discussed management of the patient with the following physicians and DINA's:    Imelda (anesthesia)     Discussion of management with other [facility] or appropriate source(s): None     Escalation of care considered, and ultimately not performed: Laboratory analysis, diagnostic imaging, and acute inpatient care management, however at this time, the patient is most appropriate for outpatient management.    Barriers to care at this time, including but not limited to: None.     DISPOSITION:  Patient will be discharged home in stable condition.    FOLLOW UP:  Bowen Alatorre II, M.D.  [address] [facility] [id number] [phone]    Schedule an appointment as soon as possible for a visit       FINAL DIAGNOSIS  1. Post-dural puncture headache Acute           Bianca MCNEIL (Scribe), am scribing for, and in the presence of, Fei Lopez M.D.    Electronically signed by: Bianca Ontiveros (Scribe), 6/7/2025    Fei MCNEIL M.D. personally performed the services described in this documentation, as scribed by Bianca Ontiveros in my presence, and it is both accurate and complete.    The note accurately reflects work and decisions made by me.  Fei Lopez M.D.  6/8/2025  12:52 AM

## 2025-06-07 NOTE — ED TRIAGE NOTES
"Chief Complaint   Patient presents with    Post-Op Complications     Pt states she had a spinal tap on Wednesday.  Pt says since then she had been having headaches when she stands.  Pt also has noted some drainage from bilateral nostrils and burning pain to her right sinuses when standing. Pt concerned she has a CSF leak.       Pt ambulatory from lobby with steady gait. Pt has history of idiopathic intracranial hypertension.  Pt neurologically intact. Denies any neurological symptoms.      Pt is alert and oriented, speaking in full sentences, follows commands and responds appropriately to questions. Resp are even and unlabored.      Pt placed in lobby. Pt educated on triage process. Pt encouraged to alert staff for any changes.     Patient and staff wearing appropriate PPE.    BP (!) 134/90   Pulse 83   Temp 36 °C (96.8 °F) (Temporal)   Resp 16   Ht 1.651 m (5' 5\")   Wt 120 kg (264 lb 5.3 oz)   SpO2 95%      "

## 2025-06-08 NOTE — DISCHARGE INSTRUCTIONS
You are seen and evaluated in the emergency department for your symptoms.  You had an epidural blood patch that was placed by anesthesia in PACU.  Please follow-up with your specialist, continue taking your normal medications, follow the protocols as above, return to the emergency department for any new or worsening symptoms.

## 2025-06-08 NOTE — ANESTHESIA PROCEDURE NOTES
Blood Patch    Performed by: Ag Segura M.D.  Authorized by: Ag Segura M.D.    Patient Location:  PACU  2 patient identifiers, IV checked, site marked, risks and benefits discussed, surgical consent, monitors and equipment checked, pre-op evaluation, timeout performed and anesthesia consent    Patient Position:  Sitting  Prep: Chloraprep, patient draped and sterile technique    Monitoring:  EKG, continuous pulse oximetry and blood pressure monitoring  Approach:  Midline  Location:  L4-5  Injection Technique:  MANISHA saline  Injection Method:  Touhy needle  Needle Gauge:  17  Needle Length (in):  3.5  Evidence of CSF/Blood?:  No  Venous Blood Drawn By::  Nurse  Sterile Technique on Blood Draw?:  Yes  Volume:  20  Events: well tolerated

## 2025-06-08 NOTE — ED NOTES
Pt will be taken to pre-op for blood patch. Report given to DIONTE Magana. All questions answered.

## 2025-06-08 NOTE — ED NOTES
Received report from DIONTE Hughes. Pt is resting in semi-fowlers position on [facility]. Respirations even and unlabored. VSS. Call light within reach. No needs at this time. Green top drawn for hcg and sent to lab.

## 2025-06-08 NOTE — OR NURSING
2000 Pt in PACU 19B, pre-op checklist done. Consent signed and secured    2014 Procedure started.    2026 End of procedure.    2027 Pt in Recovery, VSS, no complaints at the moment.    2057 Discharge instructions given, all questions answered and fully understood.    2058 Discharge criteria met.    2114 Pt for discharge, to [facility], to home with a responsible adult.

## (undated) DEVICE — GLOVE BIOGEL SZ 7 SURGICAL PF LTX - (50PR/BX 4BX/CA)

## (undated) DEVICE — KIT  I.V. START (100EA/CA)

## (undated) DEVICE — WATER IRRIGATION STERILE 1000ML (12EA/CA)

## (undated) DEVICE — TRAY SPINAL ANESTHESIA NON-SAFETY (10/CA)

## (undated) DEVICE — SUTURE 0 VICRYL PLUS CT-1 - 36 INCH (36/BX)

## (undated) DEVICE — SET EXTENSION WITH 2 PORTS (48EA/CA) ***PART #2C8610 IS A SUBSTITUTE*****

## (undated) DEVICE — SYSTEM PREVENA INCISION MNGM - (1/EA)

## (undated) DEVICE — SODIUM CHL IRRIGATION 0.9% 1000ML (12EA/CA)

## (undated) DEVICE — PACK C-SECTION (2EA/CA)

## (undated) DEVICE — TUBING CLEARLINK DUO-VENT - C-FLO (48EA/CA)

## (undated) DEVICE — HEAD HOLDER JUNIOR/ADULT

## (undated) DEVICE — SUTURE 3-0 VICRYL PLUS CT-1 - 36 INCH (36/BX)

## (undated) DEVICE — STAPLER SKIN DISP - (6/BX 10BX/CA) VISISTAT

## (undated) DEVICE — ELECTRODE DUAL RETURN W/ CORD - (50/PK)

## (undated) DEVICE — CATHETER IV NON-SAFETY 18 GA X 1 1/4 (50/BX 4BX/CA)